# Patient Record
Sex: FEMALE | Race: OTHER
[De-identification: names, ages, dates, MRNs, and addresses within clinical notes are randomized per-mention and may not be internally consistent; named-entity substitution may affect disease eponyms.]

---

## 2020-06-03 ENCOUNTER — TRANSCRIPTION ENCOUNTER (OUTPATIENT)
Age: 85
End: 2020-06-03

## 2024-01-04 ENCOUNTER — INPATIENT (INPATIENT)
Facility: HOSPITAL | Age: 89
LOS: 1 days | Discharge: HOME | DRG: 322 | End: 2024-01-06
Attending: STUDENT IN AN ORGANIZED HEALTH CARE EDUCATION/TRAINING PROGRAM | Admitting: INTERNAL MEDICINE
Payer: MEDICARE

## 2024-01-04 ENCOUNTER — TRANSCRIPTION ENCOUNTER (OUTPATIENT)
Age: 89
End: 2024-01-04

## 2024-01-04 VITALS
HEART RATE: 79 BPM | HEIGHT: 65 IN | DIASTOLIC BLOOD PRESSURE: 64 MMHG | RESPIRATION RATE: 18 BRPM | WEIGHT: 145.06 LBS | TEMPERATURE: 96 F | SYSTOLIC BLOOD PRESSURE: 102 MMHG | OXYGEN SATURATION: 100 %

## 2024-01-04 DIAGNOSIS — Z90.710 ACQUIRED ABSENCE OF BOTH CERVIX AND UTERUS: Chronic | ICD-10-CM

## 2024-01-04 LAB
A1C WITH ESTIMATED AVERAGE GLUCOSE RESULT: 6 % — HIGH (ref 4–5.6)
A1C WITH ESTIMATED AVERAGE GLUCOSE RESULT: 6 % — HIGH (ref 4–5.6)
ALBUMIN SERPL ELPH-MCNC: 4.2 G/DL — SIGNIFICANT CHANGE UP (ref 3.3–5)
ALBUMIN SERPL ELPH-MCNC: 4.2 G/DL — SIGNIFICANT CHANGE UP (ref 3.3–5)
ALBUMIN SERPL ELPH-MCNC: 4.7 G/DL — SIGNIFICANT CHANGE UP (ref 3.3–5)
ALBUMIN SERPL ELPH-MCNC: 4.7 G/DL — SIGNIFICANT CHANGE UP (ref 3.3–5)
ALP SERPL-CCNC: 67 U/L — SIGNIFICANT CHANGE UP (ref 40–120)
ALP SERPL-CCNC: 67 U/L — SIGNIFICANT CHANGE UP (ref 40–120)
ALP SERPL-CCNC: 75 U/L — SIGNIFICANT CHANGE UP (ref 40–120)
ALP SERPL-CCNC: 75 U/L — SIGNIFICANT CHANGE UP (ref 40–120)
ALT FLD-CCNC: 19 U/L — SIGNIFICANT CHANGE UP (ref 10–45)
ALT FLD-CCNC: 19 U/L — SIGNIFICANT CHANGE UP (ref 10–45)
ALT FLD-CCNC: 20 U/L — SIGNIFICANT CHANGE UP (ref 10–45)
ALT FLD-CCNC: 20 U/L — SIGNIFICANT CHANGE UP (ref 10–45)
ANION GAP SERPL CALC-SCNC: 14 MMOL/L — SIGNIFICANT CHANGE UP (ref 5–17)
ANION GAP SERPL CALC-SCNC: 14 MMOL/L — SIGNIFICANT CHANGE UP (ref 5–17)
ANION GAP SERPL CALC-SCNC: 15 MMOL/L — SIGNIFICANT CHANGE UP (ref 5–17)
ANION GAP SERPL CALC-SCNC: 15 MMOL/L — SIGNIFICANT CHANGE UP (ref 5–17)
APTT BLD: 27.9 SEC — SIGNIFICANT CHANGE UP (ref 24.5–35.6)
APTT BLD: 27.9 SEC — SIGNIFICANT CHANGE UP (ref 24.5–35.6)
AST SERPL-CCNC: 35 U/L — SIGNIFICANT CHANGE UP (ref 10–40)
AST SERPL-CCNC: 35 U/L — SIGNIFICANT CHANGE UP (ref 10–40)
AST SERPL-CCNC: 73 U/L — HIGH (ref 10–40)
AST SERPL-CCNC: 73 U/L — HIGH (ref 10–40)
BASE EXCESS BLDV CALC-SCNC: 4.3 MMOL/L — HIGH (ref -2–3)
BASE EXCESS BLDV CALC-SCNC: 4.3 MMOL/L — HIGH (ref -2–3)
BASOPHILS # BLD AUTO: 0.04 K/UL — SIGNIFICANT CHANGE UP (ref 0–0.2)
BASOPHILS # BLD AUTO: 0.04 K/UL — SIGNIFICANT CHANGE UP (ref 0–0.2)
BASOPHILS # BLD AUTO: 0.06 K/UL — SIGNIFICANT CHANGE UP (ref 0–0.2)
BASOPHILS # BLD AUTO: 0.06 K/UL — SIGNIFICANT CHANGE UP (ref 0–0.2)
BASOPHILS NFR BLD AUTO: 0.5 % — SIGNIFICANT CHANGE UP (ref 0–2)
BASOPHILS NFR BLD AUTO: 0.5 % — SIGNIFICANT CHANGE UP (ref 0–2)
BASOPHILS NFR BLD AUTO: 0.7 % — SIGNIFICANT CHANGE UP (ref 0–2)
BASOPHILS NFR BLD AUTO: 0.7 % — SIGNIFICANT CHANGE UP (ref 0–2)
BILIRUB SERPL-MCNC: 0.7 MG/DL — SIGNIFICANT CHANGE UP (ref 0.2–1.2)
BLD GP AB SCN SERPL QL: NEGATIVE — SIGNIFICANT CHANGE UP
BLD GP AB SCN SERPL QL: NEGATIVE — SIGNIFICANT CHANGE UP
BUN SERPL-MCNC: 28 MG/DL — HIGH (ref 7–23)
BUN SERPL-MCNC: 28 MG/DL — HIGH (ref 7–23)
BUN SERPL-MCNC: 31 MG/DL — HIGH (ref 7–23)
BUN SERPL-MCNC: 31 MG/DL — HIGH (ref 7–23)
CA-I SERPL-SCNC: 1.19 MMOL/L — SIGNIFICANT CHANGE UP (ref 1.15–1.33)
CA-I SERPL-SCNC: 1.19 MMOL/L — SIGNIFICANT CHANGE UP (ref 1.15–1.33)
CALCIUM SERPL-MCNC: 10 MG/DL — SIGNIFICANT CHANGE UP (ref 8.4–10.5)
CALCIUM SERPL-MCNC: 10 MG/DL — SIGNIFICANT CHANGE UP (ref 8.4–10.5)
CALCIUM SERPL-MCNC: 10.5 MG/DL — SIGNIFICANT CHANGE UP (ref 8.4–10.5)
CALCIUM SERPL-MCNC: 10.5 MG/DL — SIGNIFICANT CHANGE UP (ref 8.4–10.5)
CHLORIDE SERPL-SCNC: 100 MMOL/L — SIGNIFICANT CHANGE UP (ref 96–108)
CHLORIDE SERPL-SCNC: 100 MMOL/L — SIGNIFICANT CHANGE UP (ref 96–108)
CHLORIDE SERPL-SCNC: 102 MMOL/L — SIGNIFICANT CHANGE UP (ref 96–108)
CHLORIDE SERPL-SCNC: 102 MMOL/L — SIGNIFICANT CHANGE UP (ref 96–108)
CHOLEST SERPL-MCNC: 321 MG/DL — HIGH
CHOLEST SERPL-MCNC: 321 MG/DL — HIGH
CK MB CFR SERPL CALC: 216.8 NG/ML — HIGH (ref 0–6.7)
CK MB CFR SERPL CALC: 216.8 NG/ML — HIGH (ref 0–6.7)
CK MB CFR SERPL CALC: 80.5 NG/ML — HIGH (ref 0–6.7)
CK MB CFR SERPL CALC: 80.5 NG/ML — HIGH (ref 0–6.7)
CK SERPL-CCNC: 1230 U/L — HIGH (ref 25–170)
CK SERPL-CCNC: 1230 U/L — HIGH (ref 25–170)
CK SERPL-CCNC: 555 U/L — HIGH (ref 25–170)
CK SERPL-CCNC: 555 U/L — HIGH (ref 25–170)
CO2 BLDV-SCNC: 30.6 MMOL/L — HIGH (ref 22–26)
CO2 BLDV-SCNC: 30.6 MMOL/L — HIGH (ref 22–26)
CO2 SERPL-SCNC: 24 MMOL/L — SIGNIFICANT CHANGE UP (ref 22–31)
CO2 SERPL-SCNC: 24 MMOL/L — SIGNIFICANT CHANGE UP (ref 22–31)
CO2 SERPL-SCNC: 25 MMOL/L — SIGNIFICANT CHANGE UP (ref 22–31)
CO2 SERPL-SCNC: 25 MMOL/L — SIGNIFICANT CHANGE UP (ref 22–31)
COHGB MFR BLDV: 1.7 % — SIGNIFICANT CHANGE UP
COHGB MFR BLDV: 1.7 % — SIGNIFICANT CHANGE UP
CREAT SERPL-MCNC: 0.98 MG/DL — SIGNIFICANT CHANGE UP (ref 0.5–1.3)
CREAT SERPL-MCNC: 0.98 MG/DL — SIGNIFICANT CHANGE UP (ref 0.5–1.3)
CREAT SERPL-MCNC: 1 MG/DL — SIGNIFICANT CHANGE UP (ref 0.5–1.3)
CREAT SERPL-MCNC: 1 MG/DL — SIGNIFICANT CHANGE UP (ref 0.5–1.3)
EGFR: 54 ML/MIN/1.73M2 — LOW
EGFR: 54 ML/MIN/1.73M2 — LOW
EGFR: 56 ML/MIN/1.73M2 — LOW
EGFR: 56 ML/MIN/1.73M2 — LOW
EOSINOPHIL # BLD AUTO: 0.1 K/UL — SIGNIFICANT CHANGE UP (ref 0–0.5)
EOSINOPHIL # BLD AUTO: 0.1 K/UL — SIGNIFICANT CHANGE UP (ref 0–0.5)
EOSINOPHIL # BLD AUTO: 0.34 K/UL — SIGNIFICANT CHANGE UP (ref 0–0.5)
EOSINOPHIL # BLD AUTO: 0.34 K/UL — SIGNIFICANT CHANGE UP (ref 0–0.5)
EOSINOPHIL NFR BLD AUTO: 1.3 % — SIGNIFICANT CHANGE UP (ref 0–6)
EOSINOPHIL NFR BLD AUTO: 1.3 % — SIGNIFICANT CHANGE UP (ref 0–6)
EOSINOPHIL NFR BLD AUTO: 4 % — SIGNIFICANT CHANGE UP (ref 0–6)
EOSINOPHIL NFR BLD AUTO: 4 % — SIGNIFICANT CHANGE UP (ref 0–6)
ESTIMATED AVERAGE GLUCOSE: 126 MG/DL — HIGH (ref 68–114)
ESTIMATED AVERAGE GLUCOSE: 126 MG/DL — HIGH (ref 68–114)
GAS PNL BLDV: 142 MMOL/L — SIGNIFICANT CHANGE UP (ref 136–145)
GAS PNL BLDV: 142 MMOL/L — SIGNIFICANT CHANGE UP (ref 136–145)
GLUCOSE BLDV-MCNC: 157 MG/DL — HIGH (ref 70–99)
GLUCOSE BLDV-MCNC: 157 MG/DL — HIGH (ref 70–99)
GLUCOSE SERPL-MCNC: 147 MG/DL — HIGH (ref 70–99)
GLUCOSE SERPL-MCNC: 147 MG/DL — HIGH (ref 70–99)
GLUCOSE SERPL-MCNC: 156 MG/DL — HIGH (ref 70–99)
GLUCOSE SERPL-MCNC: 156 MG/DL — HIGH (ref 70–99)
HCO3 BLDV-SCNC: 29 MMOL/L — SIGNIFICANT CHANGE UP (ref 22–29)
HCO3 BLDV-SCNC: 29 MMOL/L — SIGNIFICANT CHANGE UP (ref 22–29)
HCT VFR BLD CALC: 40.5 % — SIGNIFICANT CHANGE UP (ref 34.5–45)
HCT VFR BLD CALC: 40.5 % — SIGNIFICANT CHANGE UP (ref 34.5–45)
HCT VFR BLD CALC: 42.3 % — SIGNIFICANT CHANGE UP (ref 34.5–45)
HCT VFR BLD CALC: 42.3 % — SIGNIFICANT CHANGE UP (ref 34.5–45)
HDLC SERPL-MCNC: 78 MG/DL — SIGNIFICANT CHANGE UP
HDLC SERPL-MCNC: 78 MG/DL — SIGNIFICANT CHANGE UP
HGB BLD CALC-MCNC: 12.2 G/DL — SIGNIFICANT CHANGE UP (ref 11.7–16.1)
HGB BLD CALC-MCNC: 12.2 G/DL — SIGNIFICANT CHANGE UP (ref 11.7–16.1)
HGB BLD-MCNC: 13.6 G/DL — SIGNIFICANT CHANGE UP (ref 11.5–15.5)
HGB BLD-MCNC: 13.6 G/DL — SIGNIFICANT CHANGE UP (ref 11.5–15.5)
HGB BLD-MCNC: 14.3 G/DL — SIGNIFICANT CHANGE UP (ref 11.5–15.5)
HGB BLD-MCNC: 14.3 G/DL — SIGNIFICANT CHANGE UP (ref 11.5–15.5)
IMM GRANULOCYTES NFR BLD AUTO: 0.1 % — SIGNIFICANT CHANGE UP (ref 0–0.9)
IMM GRANULOCYTES NFR BLD AUTO: 0.1 % — SIGNIFICANT CHANGE UP (ref 0–0.9)
IMM GRANULOCYTES NFR BLD AUTO: 0.3 % — SIGNIFICANT CHANGE UP (ref 0–0.9)
IMM GRANULOCYTES NFR BLD AUTO: 0.3 % — SIGNIFICANT CHANGE UP (ref 0–0.9)
INR BLD: 1.05 — SIGNIFICANT CHANGE UP (ref 0.85–1.18)
INR BLD: 1.05 — SIGNIFICANT CHANGE UP (ref 0.85–1.18)
INR BLD: 1.25 — HIGH (ref 0.85–1.18)
INR BLD: 1.25 — HIGH (ref 0.85–1.18)
LACTATE SERPL-SCNC: 1.8 MMOL/L — SIGNIFICANT CHANGE UP (ref 0.5–2)
LACTATE SERPL-SCNC: 1.8 MMOL/L — SIGNIFICANT CHANGE UP (ref 0.5–2)
LIPID PNL WITH DIRECT LDL SERPL: 231 MG/DL — HIGH
LIPID PNL WITH DIRECT LDL SERPL: 231 MG/DL — HIGH
LYMPHOCYTES # BLD AUTO: 1.55 K/UL — SIGNIFICANT CHANGE UP (ref 1–3.3)
LYMPHOCYTES # BLD AUTO: 1.55 K/UL — SIGNIFICANT CHANGE UP (ref 1–3.3)
LYMPHOCYTES # BLD AUTO: 19.6 % — SIGNIFICANT CHANGE UP (ref 13–44)
LYMPHOCYTES # BLD AUTO: 19.6 % — SIGNIFICANT CHANGE UP (ref 13–44)
LYMPHOCYTES # BLD AUTO: 2.68 K/UL — SIGNIFICANT CHANGE UP (ref 1–3.3)
LYMPHOCYTES # BLD AUTO: 2.68 K/UL — SIGNIFICANT CHANGE UP (ref 1–3.3)
LYMPHOCYTES # BLD AUTO: 31.3 % — SIGNIFICANT CHANGE UP (ref 13–44)
LYMPHOCYTES # BLD AUTO: 31.3 % — SIGNIFICANT CHANGE UP (ref 13–44)
MAGNESIUM SERPL-MCNC: 2.1 MG/DL — SIGNIFICANT CHANGE UP (ref 1.6–2.6)
MAGNESIUM SERPL-MCNC: 2.1 MG/DL — SIGNIFICANT CHANGE UP (ref 1.6–2.6)
MCHC RBC-ENTMCNC: 32.3 PG — SIGNIFICANT CHANGE UP (ref 27–34)
MCHC RBC-ENTMCNC: 32.3 PG — SIGNIFICANT CHANGE UP (ref 27–34)
MCHC RBC-ENTMCNC: 32.6 PG — SIGNIFICANT CHANGE UP (ref 27–34)
MCHC RBC-ENTMCNC: 32.6 PG — SIGNIFICANT CHANGE UP (ref 27–34)
MCHC RBC-ENTMCNC: 33.6 GM/DL — SIGNIFICANT CHANGE UP (ref 32–36)
MCHC RBC-ENTMCNC: 33.6 GM/DL — SIGNIFICANT CHANGE UP (ref 32–36)
MCHC RBC-ENTMCNC: 33.8 GM/DL — SIGNIFICANT CHANGE UP (ref 32–36)
MCHC RBC-ENTMCNC: 33.8 GM/DL — SIGNIFICANT CHANGE UP (ref 32–36)
MCV RBC AUTO: 96.2 FL — SIGNIFICANT CHANGE UP (ref 80–100)
MCV RBC AUTO: 96.2 FL — SIGNIFICANT CHANGE UP (ref 80–100)
MCV RBC AUTO: 96.6 FL — SIGNIFICANT CHANGE UP (ref 80–100)
MCV RBC AUTO: 96.6 FL — SIGNIFICANT CHANGE UP (ref 80–100)
METHGB MFR BLDV: 0.7 % — SIGNIFICANT CHANGE UP
METHGB MFR BLDV: 0.7 % — SIGNIFICANT CHANGE UP
MONOCYTES # BLD AUTO: 0.34 K/UL — SIGNIFICANT CHANGE UP (ref 0–0.9)
MONOCYTES # BLD AUTO: 0.34 K/UL — SIGNIFICANT CHANGE UP (ref 0–0.9)
MONOCYTES # BLD AUTO: 0.48 K/UL — SIGNIFICANT CHANGE UP (ref 0–0.9)
MONOCYTES # BLD AUTO: 0.48 K/UL — SIGNIFICANT CHANGE UP (ref 0–0.9)
MONOCYTES NFR BLD AUTO: 4.3 % — SIGNIFICANT CHANGE UP (ref 2–14)
MONOCYTES NFR BLD AUTO: 4.3 % — SIGNIFICANT CHANGE UP (ref 2–14)
MONOCYTES NFR BLD AUTO: 5.6 % — SIGNIFICANT CHANGE UP (ref 2–14)
MONOCYTES NFR BLD AUTO: 5.6 % — SIGNIFICANT CHANGE UP (ref 2–14)
NEUTROPHILS # BLD AUTO: 4.98 K/UL — SIGNIFICANT CHANGE UP (ref 1.8–7.4)
NEUTROPHILS # BLD AUTO: 4.98 K/UL — SIGNIFICANT CHANGE UP (ref 1.8–7.4)
NEUTROPHILS # BLD AUTO: 5.85 K/UL — SIGNIFICANT CHANGE UP (ref 1.8–7.4)
NEUTROPHILS # BLD AUTO: 5.85 K/UL — SIGNIFICANT CHANGE UP (ref 1.8–7.4)
NEUTROPHILS NFR BLD AUTO: 58.3 % — SIGNIFICANT CHANGE UP (ref 43–77)
NEUTROPHILS NFR BLD AUTO: 58.3 % — SIGNIFICANT CHANGE UP (ref 43–77)
NEUTROPHILS NFR BLD AUTO: 74 % — SIGNIFICANT CHANGE UP (ref 43–77)
NEUTROPHILS NFR BLD AUTO: 74 % — SIGNIFICANT CHANGE UP (ref 43–77)
NON HDL CHOLESTEROL: 243 MG/DL — HIGH
NON HDL CHOLESTEROL: 243 MG/DL — HIGH
NRBC # BLD: 0 /100 WBCS — SIGNIFICANT CHANGE UP (ref 0–0)
NT-PROBNP SERPL-SCNC: 699 PG/ML — HIGH (ref 0–300)
NT-PROBNP SERPL-SCNC: 699 PG/ML — HIGH (ref 0–300)
NT-PROBNP SERPL-SCNC: 809 PG/ML — HIGH (ref 0–300)
NT-PROBNP SERPL-SCNC: 809 PG/ML — HIGH (ref 0–300)
PCO2 BLDV: 44 MMHG — SIGNIFICANT CHANGE UP (ref 39–52)
PCO2 BLDV: 44 MMHG — SIGNIFICANT CHANGE UP (ref 39–52)
PH BLDV: 7.43 — SIGNIFICANT CHANGE UP (ref 7.32–7.43)
PH BLDV: 7.43 — SIGNIFICANT CHANGE UP (ref 7.32–7.43)
PHOSPHATE SERPL-MCNC: 3.1 MG/DL — SIGNIFICANT CHANGE UP (ref 2.5–4.5)
PHOSPHATE SERPL-MCNC: 3.1 MG/DL — SIGNIFICANT CHANGE UP (ref 2.5–4.5)
PLATELET # BLD AUTO: 301 K/UL — SIGNIFICANT CHANGE UP (ref 150–400)
PLATELET # BLD AUTO: 301 K/UL — SIGNIFICANT CHANGE UP (ref 150–400)
PLATELET # BLD AUTO: 358 K/UL — SIGNIFICANT CHANGE UP (ref 150–400)
PLATELET # BLD AUTO: 358 K/UL — SIGNIFICANT CHANGE UP (ref 150–400)
PO2 BLDV: 42 MMHG — SIGNIFICANT CHANGE UP (ref 25–45)
PO2 BLDV: 42 MMHG — SIGNIFICANT CHANGE UP (ref 25–45)
POTASSIUM BLDV-SCNC: 3.6 MMOL/L — SIGNIFICANT CHANGE UP (ref 3.5–5.1)
POTASSIUM BLDV-SCNC: 3.6 MMOL/L — SIGNIFICANT CHANGE UP (ref 3.5–5.1)
POTASSIUM SERPL-MCNC: 3.9 MMOL/L — SIGNIFICANT CHANGE UP (ref 3.5–5.3)
POTASSIUM SERPL-MCNC: 3.9 MMOL/L — SIGNIFICANT CHANGE UP (ref 3.5–5.3)
POTASSIUM SERPL-MCNC: 4.1 MMOL/L — SIGNIFICANT CHANGE UP (ref 3.5–5.3)
POTASSIUM SERPL-MCNC: 4.1 MMOL/L — SIGNIFICANT CHANGE UP (ref 3.5–5.3)
POTASSIUM SERPL-SCNC: 3.9 MMOL/L — SIGNIFICANT CHANGE UP (ref 3.5–5.3)
POTASSIUM SERPL-SCNC: 3.9 MMOL/L — SIGNIFICANT CHANGE UP (ref 3.5–5.3)
POTASSIUM SERPL-SCNC: 4.1 MMOL/L — SIGNIFICANT CHANGE UP (ref 3.5–5.3)
POTASSIUM SERPL-SCNC: 4.1 MMOL/L — SIGNIFICANT CHANGE UP (ref 3.5–5.3)
PROT SERPL-MCNC: 8.3 G/DL — SIGNIFICANT CHANGE UP (ref 6–8.3)
PROTHROM AB SERPL-ACNC: 11.9 SEC — SIGNIFICANT CHANGE UP (ref 9.5–13)
PROTHROM AB SERPL-ACNC: 11.9 SEC — SIGNIFICANT CHANGE UP (ref 9.5–13)
PROTHROM AB SERPL-ACNC: 14.2 SEC — HIGH (ref 9.5–13)
PROTHROM AB SERPL-ACNC: 14.2 SEC — HIGH (ref 9.5–13)
RBC # BLD: 4.21 M/UL — SIGNIFICANT CHANGE UP (ref 3.8–5.2)
RBC # BLD: 4.21 M/UL — SIGNIFICANT CHANGE UP (ref 3.8–5.2)
RBC # BLD: 4.38 M/UL — SIGNIFICANT CHANGE UP (ref 3.8–5.2)
RBC # BLD: 4.38 M/UL — SIGNIFICANT CHANGE UP (ref 3.8–5.2)
RBC # FLD: 13.5 % — SIGNIFICANT CHANGE UP (ref 10.3–14.5)
RBC # FLD: 13.5 % — SIGNIFICANT CHANGE UP (ref 10.3–14.5)
RBC # FLD: 13.6 % — SIGNIFICANT CHANGE UP (ref 10.3–14.5)
RBC # FLD: 13.6 % — SIGNIFICANT CHANGE UP (ref 10.3–14.5)
RH IG SCN BLD-IMP: POSITIVE — SIGNIFICANT CHANGE UP
RH IG SCN BLD-IMP: POSITIVE — SIGNIFICANT CHANGE UP
SAO2 % BLDV: 62 % — LOW (ref 67–88)
SAO2 % BLDV: 62 % — LOW (ref 67–88)
SODIUM SERPL-SCNC: 139 MMOL/L — SIGNIFICANT CHANGE UP (ref 135–145)
SODIUM SERPL-SCNC: 139 MMOL/L — SIGNIFICANT CHANGE UP (ref 135–145)
SODIUM SERPL-SCNC: 141 MMOL/L — SIGNIFICANT CHANGE UP (ref 135–145)
SODIUM SERPL-SCNC: 141 MMOL/L — SIGNIFICANT CHANGE UP (ref 135–145)
T4 AB SER-ACNC: 9.29 UG/DL — SIGNIFICANT CHANGE UP (ref 4.5–11.7)
T4 AB SER-ACNC: 9.29 UG/DL — SIGNIFICANT CHANGE UP (ref 4.5–11.7)
TRIGL SERPL-MCNC: 58 MG/DL — SIGNIFICANT CHANGE UP
TRIGL SERPL-MCNC: 58 MG/DL — SIGNIFICANT CHANGE UP
TROPONIN T, HIGH SENSITIVITY RESULT: 2779 NG/L — CRITICAL HIGH (ref 0–51)
TROPONIN T, HIGH SENSITIVITY RESULT: 2779 NG/L — CRITICAL HIGH (ref 0–51)
TROPONIN T, HIGH SENSITIVITY RESULT: 820 NG/L — CRITICAL HIGH (ref 0–51)
TROPONIN T, HIGH SENSITIVITY RESULT: 820 NG/L — CRITICAL HIGH (ref 0–51)
TROPONIN T, HIGH SENSITIVITY RESULT: 91 NG/L — CRITICAL HIGH (ref 0–51)
TROPONIN T, HIGH SENSITIVITY RESULT: 91 NG/L — CRITICAL HIGH (ref 0–51)
TSH SERPL-MCNC: 1.82 UIU/ML — SIGNIFICANT CHANGE UP (ref 0.27–4.2)
TSH SERPL-MCNC: 1.82 UIU/ML — SIGNIFICANT CHANGE UP (ref 0.27–4.2)
WBC # BLD: 7.9 K/UL — SIGNIFICANT CHANGE UP (ref 3.8–10.5)
WBC # BLD: 7.9 K/UL — SIGNIFICANT CHANGE UP (ref 3.8–10.5)
WBC # BLD: 8.55 K/UL — SIGNIFICANT CHANGE UP (ref 3.8–10.5)
WBC # BLD: 8.55 K/UL — SIGNIFICANT CHANGE UP (ref 3.8–10.5)
WBC # FLD AUTO: 7.9 K/UL — SIGNIFICANT CHANGE UP (ref 3.8–10.5)
WBC # FLD AUTO: 7.9 K/UL — SIGNIFICANT CHANGE UP (ref 3.8–10.5)
WBC # FLD AUTO: 8.55 K/UL — SIGNIFICANT CHANGE UP (ref 3.8–10.5)
WBC # FLD AUTO: 8.55 K/UL — SIGNIFICANT CHANGE UP (ref 3.8–10.5)

## 2024-01-04 PROCEDURE — 71045 X-RAY EXAM CHEST 1 VIEW: CPT | Mod: 26

## 2024-01-04 PROCEDURE — 93010 ELECTROCARDIOGRAM REPORT: CPT

## 2024-01-04 PROCEDURE — 93306 TTE W/DOPPLER COMPLETE: CPT | Mod: 26

## 2024-01-04 PROCEDURE — 99291 CRITICAL CARE FIRST HOUR: CPT

## 2024-01-04 PROCEDURE — 92941 PRQ TRLML REVSC TOT OCCL AMI: CPT | Mod: LD

## 2024-01-04 PROCEDURE — 99152 MOD SED SAME PHYS/QHP 5/>YRS: CPT

## 2024-01-04 PROCEDURE — 93460 R&L HRT ART/VENTRICLE ANGIO: CPT | Mod: 26,59

## 2024-01-04 RX ORDER — LEVOTHYROXINE SODIUM 125 MCG
88 TABLET ORAL EVERY 24 HOURS
Refills: 0 | Status: DISCONTINUED | OUTPATIENT
Start: 2024-01-05 | End: 2024-01-06

## 2024-01-04 RX ORDER — HEPARIN SODIUM 5000 [USP'U]/ML
5000 INJECTION INTRAVENOUS; SUBCUTANEOUS EVERY 12 HOURS
Refills: 0 | Status: DISCONTINUED | OUTPATIENT
Start: 2024-01-04 | End: 2024-01-06

## 2024-01-04 RX ORDER — VALSARTAN 80 MG/1
20 TABLET ORAL EVERY 12 HOURS
Refills: 0 | Status: DISCONTINUED | OUTPATIENT
Start: 2024-01-04 | End: 2024-01-05

## 2024-01-04 RX ORDER — TICAGRELOR 90 MG/1
90 TABLET ORAL EVERY 12 HOURS
Refills: 0 | Status: DISCONTINUED | OUTPATIENT
Start: 2024-01-04 | End: 2024-01-04

## 2024-01-04 RX ORDER — TICAGRELOR 90 MG/1
90 TABLET ORAL EVERY 12 HOURS
Refills: 0 | Status: DISCONTINUED | OUTPATIENT
Start: 2024-01-04 | End: 2024-01-06

## 2024-01-04 RX ORDER — ASPIRIN/CALCIUM CARB/MAGNESIUM 324 MG
81 TABLET ORAL EVERY 24 HOURS
Refills: 0 | Status: DISCONTINUED | OUTPATIENT
Start: 2024-01-04 | End: 2024-01-06

## 2024-01-04 RX ORDER — ATORVASTATIN CALCIUM 80 MG/1
80 TABLET, FILM COATED ORAL AT BEDTIME
Refills: 0 | Status: DISCONTINUED | OUTPATIENT
Start: 2024-01-04 | End: 2024-01-05

## 2024-01-04 RX ADMIN — TICAGRELOR 90 MILLIGRAM(S): 90 TABLET ORAL at 21:39

## 2024-01-04 RX ADMIN — HEPARIN SODIUM 5000 UNIT(S): 5000 INJECTION INTRAVENOUS; SUBCUTANEOUS at 21:40

## 2024-01-04 RX ADMIN — ATORVASTATIN CALCIUM 80 MILLIGRAM(S): 80 TABLET, FILM COATED ORAL at 21:39

## 2024-01-04 RX ADMIN — Medication 81 MILLIGRAM(S): at 21:34

## 2024-01-04 RX ADMIN — VALSARTAN 20 MILLIGRAM(S): 80 TABLET ORAL at 17:11

## 2024-01-04 NOTE — H&P ADULT - ASSESSMENT
Pt is a 88F with a PMHx hypothyroidism p/w 1 hour of CP found to have anterolateral STEMI.    #Neuro  at baseline    Cards  #Anterolateral STEMI  Pt with substernal CP and EKG with FREDERICK on V1-V3. s/p Asa 325 prior to arrival to the ED. Pt went to cath lab and LHC showing 100% occlusion of mid LAD, thrombotic s/p SHILO and Nitroglycerin. Troponin 91. Pro-. Post-revascularization EKG with FREDERICK in V1 and V2, TWI in V1-V3.  - c/w ASA 81qd  - c/w statin 80mg   - start brilinta 80mg qd  - start betablocker tmmwr  - f/u A1c, Lipid profile  - f/u repeat echo, EKG  - f/u lactate  - trend trops    Pulm  CARMENZA    #Endocrine  hx of hypothyroidism  - f/u TSH, Free T4    #GI  CARMENZA    #Renal  CARMENZA    #  CARMENZA    Fluids: none  Electrolytes: Mg >2, K >4  Nutrition: Regular diet  Prophylaxis: heparin subq  Activity: as tolerated  GI: none  C: ?  Dispo: CCU       Pt is a 88F with a PMHx hypothyroidism p/w 1 hour of CP found to have anterolateral STEMI.    #Neuro  at baseline    Cards  #Anterolateral STEMI  Pt with substernal CP and EKG with FREDERICK on V1-V3. s/p Asa 325 prior to arrival to the ED. Pt went to cath lab and LHC showing 100% occlusion of mid LAD, thrombotic s/p SHILO and Nitroglycerin. Troponin 91. Pro-. Post-revascularization EKG with FREDERICK in V1 and V2, TWI in V1-V3.  - c/w ASA 81qd  - c/w statin 80mg   - start brilinta 80mg qd  - start betablocker tmmwr  - f/u A1c, Lipid profile  - f/u repeat echo, EKG  - f/u lactate  - trend trops    Pulm  CARMENZA    #Endocrine  hx of hypothyroidism. On synthroid 88mg  - f/u TSH, Free T4  - c/w home meds    #GI  CARMENZA    #Renal  CARMENZA    #  CARMENZA    Fluids: none  Electrolytes: Mg >2, K >4  Nutrition: Regular diet  Prophylaxis: heparin subq  Activity: as tolerated  GI: none  C: ?  Dispo: CCU       Pt is a 88F with a PMHx hypothyroidism p/w 1 hour of CP found to have anterolateral STEMI s/p SHILO to mLAD    #Neuro  at baseline    Cards  #Anterolateral STEMI  Pt with substernal CP and EKG with FREDERICK on V1-V3. s/p Asa 325 prior to arrival to the ED. Pt went to cath lab and LHC showing 100% occlusion of mid LAD, thrombotic s/p SHILO and Nitroglycerin. Troponin 91. Pro-. Post-revascularization EKG with FREDERICK in V1 and V2, TWI in V1-V3.  - c/w ASA 81qd  - c/w statin 80mg   - start brilinta 80mg qd  - start betablocker tmmwr  - f/u A1c, Lipid profile  - f/u repeat echo, EKG  - f/u lactate  - trend trops    Pulm  CARMENZA    #Endocrine  hx of hypothyroidism. On synthroid 88mg  - f/u TSH, Free T4  - c/w home meds    #GI  CARMENZA    #Renal  CARMENZA    #  CARMENZA    Fluids: none  Electrolytes: Mg >2, K >4  Nutrition: Regular diet  Prophylaxis: heparin subq  Activity: as tolerated  GI: none  C: ?  Dispo: CCU

## 2024-01-04 NOTE — ED PROVIDER NOTE - CRITICAL CARE ATTENDING CONTRIBUTION TO CARE
Upon my evaluation, this patient had a high probability of imminent or life-threatening deterioration due to STEMI which required my direct attention, intervention, and personal management.    I have personally provided the above minutes of critical care time exclusive of time spent on separately billable procedures. Time includes review of laboratory data, radiology results, discussion with consultants, and monitoring for potential decompensation. Interventions were performed as documented above.

## 2024-01-04 NOTE — H&P ADULT - HISTORY OF PRESENT ILLNESS
In the ED:    Vitals:  labs:  EKG:  CXR:  interventions:     88F with a PMHx hypothyroidism who was BIBEMS due to n/v/diarrhea followed by substernal chest pain.    STEMI    In the ED:   Vitals: HR 79, /64, RR 18, SpO2 100%  labs: BUN 31, pro-, PCO2 30.6  EKG: FREDERICK in V1, V2, V3  CXR:   interventions: .     88F with a PMHx hypothyroidism who was BIBEMS due to n/v/diarrhea followed by substernal chest pain for an hour while working out around 10a. In the ED, she was found to have FREDERICK in V1-V3 (anterolateral STEMI). LHC showing 100% occlusion of mid LAD, thrombotic s/p SHILO and Nitroglycerin.    In the ED:   Vitals: HR 79, /64, RR 18, SpO2 100%  labs: BUN 31, pro-, PCO2 30.6  EKG: FREDERICK in V1, V2, V3  interventions:  prior to arrival     88F with a PMHx hypothyroidism who was BIBEMS due to n/v/diarrhea followed by substernal chest pain for an hour before her pilates class around 10a. She denies any previous history of CP or SOB.  In the ED, she was found to have FREDERICK in V1-V3 (anterolateral STEMI). LHC showing 100% occlusion of mid LAD, thrombotic s/p SHILO and Nitroglycerin.    Upon arrival to CCU, pt does not endorse any SOB, CP, further episodes of N/V or diarrhea. Overall, she feels well.    In the ED:   Vitals: HR 79, /64, RR 18, SpO2 100%  labs: BUN 31, pro-, PCO2 30.6  EKG: FREDERICK in V1, V2, V3  interventions:  prior to arrival

## 2024-01-04 NOTE — ED ADULT NURSE NOTE - OBJECTIVE STATEMENT
88yF BIBEMS STEMI cod. ST elevations in V1,V2,V3.Receved 324 mg ASA prior to arrival. PT Axox4 reports mild midsternal chest pain. Cath lab initiated on arrival by Dr. Cortez. Interventional cardiology fellow at bedside at 11:32, pt left unit at 11:38.

## 2024-01-04 NOTE — H&P ADULT - NSHPLABSRESULTS_GEN_ALL_CORE
LABS:                         14.3   8.55  )-----------( 358      ( 04 Jan 2024 11:31 )             42.3     01-04    141  |  102  |  31<H>  ----------------------------<  156<H>  3.9   |  25  |  1.00    Ca    10.5      04 Jan 2024 11:31    TPro  8.3  /  Alb  4.7  /  TBili  0.7  /  DBili  x   /  AST  35  /  ALT  20  /  AlkPhos  75  01-04    PT/INR - ( 04 Jan 2024 11:31 )   PT: 11.9 sec;   INR: 1.05          PTT - ( 04 Jan 2024 11:31 )  PTT:27.9 sec  Urinalysis Basic - ( 04 Jan 2024 11:31 )    Color: x / Appearance: x / SG: x / pH: x  Gluc: 156 mg/dL / Ketone: x  / Bili: x / Urobili: x   Blood: x / Protein: x / Nitrite: x   Leuk Esterase: x / RBC: x / WBC x   Sq Epi: x / Non Sq Epi: x / Bacteria: x                RADIOLOGY, EKG & ADDITIONAL TESTS: Reviewed.

## 2024-01-04 NOTE — ED ADULT NURSE NOTE - CHIEF COMPLAINT QUOTE
Pt aaox3 c/o midsternal CP that started when the pt was at the [facility]. Pt has 1x episode of N/V and diarrhea. Pt placed on 2L NC and given 324 asprin by EMS PTA. Pt aaox3 c/o midsternal CP that started when the pt was at the [facility]. Pt has 1x episode of N/V and diarrhea. Pt placed on 2L NC and given 324 asprin by EMS PTA.

## 2024-01-04 NOTE — DISCHARGE NOTE PROVIDER - NSDCCPCAREPLAN_GEN_ALL_CORE_FT
PRINCIPAL DISCHARGE DIAGNOSIS  Diagnosis: STEMI (ST elevation myocardial infarction)  Assessment and Plan of Treatment: A heart attack is an event that occurs when part of the heart muscle does not get enough blood and oxygen. This part of the heart starts to die. A heart attack is also called a myocardial infarction, or MI.   A heart attack most often happens because blood flow through one or more of the coronary arteries is blocked. This blockage is usually caused by a blood clot that forms when plaque in the artery breaks open. When that happens, we may see some elevation of lines on EKG, which is a dangerous sign.  Luckily we were able to take you to the cath lab and we performed Percutaneous coronary intervention (PCI) to open up your blocked artery and put a stent in to keep it open.   After a heart attack, you may be worried about your future. Over the next several weeks, your heart will start to heal. Though it can be hard to break old habits, you can reduce your risk of having another heart attack. You can do this by making some lifestyle changes and by taking medicines. You would need to take medicines to help your heart heal (Guideline-directed medical therapy (GDMT)) and also antiplatelets to keep the stent open.   Please follow up with your Primary Care Physician and cardiologist within ___________________ week of discharge from the hospital.     PRINCIPAL DISCHARGE DIAGNOSIS  Diagnosis: STEMI (ST elevation myocardial infarction)  Assessment and Plan of Treatment: A heart attack is an event that occurs when part of the heart muscle does not get enough blood and oxygen. This part of the heart starts to die. A heart attack is also called a myocardial infarction, or MI.   A heart attack most often happens because blood flow through one or more of the coronary arteries is blocked. This blockage is usually caused by a blood clot that forms when plaque in the artery breaks open. When that happens, we may see some elevation of lines on EKG, which is a dangerous sign.  Luckily we were able to take you to the cath lab and we performed Percutaneous coronary intervention (PCI) to open up your blocked artery and put a stent in to keep it open.   After a heart attack, you may be worried about your future. Over the next several weeks, your heart will start to heal. Though it can be hard to break old habits, you can reduce your risk of having another heart attack. You can do this by making some lifestyle changes and by taking medicines. You would need to take medicines to help your heart heal (Guideline-directed medical therapy (GDMT)) and also antiplatelets to keep the stent open.   Please follow up with your Primary Care Physician and cardiologist within one  week of discharge from the hospital.

## 2024-01-04 NOTE — ED PROVIDER NOTE - OBJECTIVE STATEMENT
88F with a PMHx hypothyroidism who was BIBEMS as STEMI. Pt was getting ready for a workout class at 10am when she had n/v/diarrhea followed by substernal chest pain. EMR noted FREDERICK in V1, V2, V3 and gave her asa 324 PTA. On arrival to the ER, pt states CP has improved and is now 1/10. She denies hx of CAD. No SOB, LH, dizziness or syncope. Cath lab code called immediately upon arrival to the ER.

## 2024-01-04 NOTE — H&P ADULT - ATTENDING COMMENTS
Pt is an 87 y/o woman c hypothyroidism who p/w CP and found to have Ant STEMI on ECG.    ECG: Sinus @ 86 c 7mm FREDERICK V1-V4  Cath: mid % s/p PCI    Agree to admit to CICU  Monitor rhythm  Would start ARB for Ant Stemi  Check TTE Pt is an 89 y/o woman c hypothyroidism who p/w CP and found to have Ant STEMI on ECG.    ECG: Sinus @ 86 c 7mm FREDERICK V1-V4  Cath: mid % s/p PCI    Agree to admit to CICU  Monitor rhythm  Would start ARB for Ant Stemi  Check TTE

## 2024-01-04 NOTE — DISCHARGE NOTE PROVIDER - PROVIDER TOKENS
PROVIDER:[TOKEN:[[id number]],FOLLOWUP:[1 week]] PROVIDER:[TOKEN:[[id number]],FOLLOWUP:[1 week]] FREE:[LAST:[PARK],FIRST:[FELIX DORAN],PHONE:[(   )    -],FAX:[(   )    -],ADDRESS:[[address] [place] [address] Floor 1  [phone]  Follow up in 1 week],FOLLOWUP:[1 week]] FREE:[LAST:[PARK],FIRST:[FELIX DORAN],PHONE:[(   )    -],FAX:[(   )    -],ADDRESS:[[address] [place] [address] Floor 1  [phone]  Follow up in 1 week],FOLLOWUP:[1 week]]

## 2024-01-04 NOTE — DISCHARGE NOTE PROVIDER - HOSPITAL COURSE
#Discharge: do not delete    88F with a PMHx hypothyroidism p/w 1 hour of CP found to have anterolateral STEMI s/p SHILO to mLAD      Problem List/Main Diagnoses:   Inpatient treatment course:   New medications:   Labs to be followed outpatient:   Exam to be followed outpatient: #Discharge: do not delete    88F with a PMHx hypothyroidism, HLD, HFrEF (25-30%) p/w 1 hour of CP found to have anterolateral STEMI s/p SHILO to mLAD.    #Anterolateral STEMI s/p SHILO to mLAD  Pt with substernal CP and EKG with FREDERICK on V1-V3. s/p Asa 325 prior to arrival to the ED. Pt went to cath lab and LHC showing 100% occlusion of mid LAD, thrombotic s/p SHILO and Nitroglycerin. Troponin 91. Pro-. Post-revascularization EKG with FREDERICK in V1 and V2, TWI in V1-V3. DC'd Entresto as pt has JUANITA  - c/w toprol 25 for   - c/w ASA 81qd  - c/w statin 40mg   - c/w brilinta 80mg qd  - f/u outpatient Cardiology    #HFrEF  TTE (1/4/23) showing Left ventricular systolic function is severely reduced w/ EF of 25-30% with regional wall motion abnormalities  - c/w toprol 25  - start ACEi/ARB    #Hypothyroidism  Home meds: synthroid 88mg  - c/w home meds  - outpatient PCP follow up    #Pre-diabetes  A1c 6.0  - c/w lifestyle modifications  - f/u outpatient PCP    New medications: ASA, statin, Metoprolol succinate, ARB  Labs to be followed outpatient: A1c in three months  Exam to be followed outpatient: none    Physical exam at the time of discharge:  General: NAD, lying in bed comfortably  Head: NC/AT; MMM; PERRL; EOMI;  Neck: Supple; no JVD  Respiratory: CTAB; no wheezes/rales/rhonchi  Cardiovascular: Regular rhythm/rate; S1/S2+, no murmurs, rubs gallops   Gastrointestinal: Soft; NTND; bowel sounds normal and present  Extremities: WWP; no edema/cyanosis  Neurological: A&Ox3, answering all questions appropriately       #Discharge: do not delete    88F with a PMHx hypothyroidism, HLD, HFrEF (25-30%) p/w 1 hour of CP found to have anterolateral STEMI s/p SHILO to mLAD.    #Anterolateral STEMI s/p SHILO to mLAD  Pt with substernal CP and EKG with FREDERICK on V1-V3. s/p Asa 325 prior to arrival to the ED. Pt went to cath lab and LHC showing 100% occlusion of mid LAD, thrombotic s/p SHILO and Nitroglycerin. Troponin 91. Pro-. Post-revascularization EKG with FREDERICK in V1 and V2, TWI in V1-V3. DC'd Entresto as pt has JUANITA  Continue with toprol 25; ASA 81qd, atorvastatin 40mg, brilinta 80mg qd. Follow up with your outpatient provider    #HFrEF  TTE (1/4/23) showing Left ventricular systolic function is severely reduced w/ EF of 25-30% with regional wall motion abnormalities. Continue with toprol 25 and losartan 25mg  - start ACEi/ARB    #Hypothyroidism  Home meds: synthroid 88mg  Continue with home meds    #Pre-diabetes  A1c 6.0  Continue with lifestyle modifications. Follow up with outpatient provider    New medications: ASA, statin, Metoprolol succinate, losartan  Labs to be followed outpatient: A1c in three months  Exam to be followed outpatient: none    Physical exam at the time of discharge:  General: NAD, lying in bed comfortably  Head: NC/AT; MMM; PERRL; EOMI;  Neck: Supple; no JVD  Respiratory: CTAB; no wheezes/rales/rhonchi  Cardiovascular: Regular rhythm/rate; S1/S2+, no murmurs, rubs gallops   Gastrointestinal: Soft; NTND; bowel sounds normal and present  Extremities: WWP; no edema/cyanosis  Neurological: A&Ox3, answering all questions appropriately       #Discharge: do not delete    88F with a PMHx hypothyroidism, HLD, HFrEF (25-30%) p/w 1 hour of CP found to have anterolateral STEMI s/p SHILO to mLAD.    #Anterolateral STEMI s/p SHILO to mLAD  Pt with substernal CP and EKG with FREDERICK on V1-V3. s/p Asa 325 prior to arrival to the ED. Pt went to cath lab and LHC showing 100% occlusion of mid LAD, thrombotic s/p SHILO and Nitroglycerin. Troponin 91. Pro-. Post-revascularization EKG with FREDERICK in V1 and V2, TWI in V1-V3. DC'd Entresto as pt has JUANITA  Continue with toprol 25; ASA 81qd, atorvastatin 40mg, brilinta 90mg BID. Follow up with your outpatient provider    #HFrEF  TTE (1/4/23) showing Left ventricular systolic function is severely reduced w/ EF of 25-30% with regional wall motion abnormalities. Continue with toprol 25 and losartan 25mg  - start ACEi/ARB    #Hypothyroidism  Home meds: synthroid 88mg  Continue with home meds    #Pre-diabetes  A1c 6.0  Continue with lifestyle modifications. Follow up with outpatient provider    New medications: ASA, statin, Metoprolol succinate, losartan  Labs to be followed outpatient: A1c in three months  Exam to be followed outpatient: none    Physical exam at the time of discharge:  General: NAD, lying in bed comfortably  Head: NC/AT; MMM; PERRL; EOMI;  Neck: Supple; no JVD  Respiratory: CTAB; no wheezes/rales/rhonchi  Cardiovascular: Regular rhythm/rate; S1/S2+, no murmurs, rubs gallops   Gastrointestinal: Soft; NTND; bowel sounds normal and present  Extremities: WWP; no edema/cyanosis  Neurological: A&Ox3, answering all questions appropriately

## 2024-01-04 NOTE — H&P ADULT - NSHPSOCIALHISTORY_GEN_ALL_CORE
Pt lives alone in the [facility] and does not use any assistive devices to get around Pt lives alone in the [facility] and does not use any assistive devices to get around

## 2024-01-04 NOTE — DISCHARGE NOTE PROVIDER - NSDCCPTREATMENT_GEN_ALL_CORE_FT
PRINCIPAL PROCEDURE  Procedure: PCI, with stent insertion  Findings and Treatment: LHC: 100% occlusion of mid LAD, thrombotic s/p SHILO and Nitroglycerin.      SECONDARY PROCEDURE  Procedure: Transthoracic echocardiography (TTE)  Findings and Treatment: CONCLUSIONS:   1. Normal left ventricular size.   2. Left ventricular systolic function is severely reduced with a   calculated ejection fraction of 25-30% with regional wall motion   abnormalities.   3. Normal right ventricular size with mildly reduced right ventricular   systolic function.   4. Normal atria.   5. Aortic sclerosis without significant stenosis.   6. No evidence of pulmonary hypertension.   7. No pericardial effusion.   8. No prior echo is available for comparison.

## 2024-01-04 NOTE — ED PROVIDER NOTE - PHYSICAL EXAMINATION
GEN: Elderly, well developed, awake, alert, oriented to person, place, time/situation and in no apparent distress. NTAF  ENT: Airway patent, Nasal mucosa clear. Mouth with normal mucosa.  EYES: Clear bilaterally. PERRL, EOMI  RESPIRATORY: Breathing comfortably with normal RR. No W/C/R, no hypoxia or resp distress.  CARDIAC: Regular rate and rhythm, no M/R/G  ABDOMEN: Soft, nontender, +bowel sounds, no rebound, rigidity, or guarding.  MSK: Range of motion is not limited, no deformities noted.  NEURO: Alert and oriented, no focal deficits.  SKIN: Skin normal color for race, warm, dry and intact. No evidence of rash.  PSYCH: Alert and oriented to person, place, time/situation. somewhat anxious mood and affect. no apparent risk to self or others.

## 2024-01-04 NOTE — ED CLERICAL - CLERICAL COMMENTS
Dr. Cortez called stemi cath lab code @[id number] [facility] [address]. Cortez called stemi cath lab code @[id number] [facility]

## 2024-01-04 NOTE — DISCHARGE NOTE PROVIDER - ATTENDING DISCHARGE PHYSICAL EXAMINATION:
GENERAL: No acute distress, well-developed  HEAD:  Atraumatic, Normocephalic  ENT: EOMI, PERRLA, conjunctiva and sclera clear, Neck supple, No JVD, moist mucosa  CHEST/LUNG: Clear to auscultation bilaterally; No wheeze, equal breath sounds bilaterally   BACK: No spinal tenderness  HEART: Regular rate and rhythm; No murmurs, rubs, or gallops  ABDOMEN: Soft, Nontender, Nondistended; Bowel sounds present  EXTREMITIES:  No clubbing, cyanosis, or edema  PSYCH: Nl behavior, nl affect  NEUROLOGY: AAOx3, non-focal, cranial nerves intact  SKIN: Normal color, No rashes or lesions

## 2024-01-04 NOTE — DISCHARGE NOTE PROVIDER - CARE PROVIDER_API CALL
Kel Carter  Cardiovascular Disease  [address] [address] [place] [address], 4[address] [place] [address]  Phone: [phone]  Fax: [phone]  Follow Up Time: 1 week   Kel Carter  Cardiovascular Disease  [address] [address] [place] [address], 4th [place] [address]  Phone: [phone]  Fax: [phone]  Follow Up Time: 1 week   FELIX BAKER  [address] [facility] [address] Floor 1  [phone]  Follow up in 1 week  Phone: (   )    -  Fax: (   )    -  Follow Up Time: 1 week   FELIX BAKER  [address] [facility] [address] Floor 1  [phone]  Follow up in 1 week  Phone: (   )    -  Fax: (   )    -  Follow Up Time: 1 week

## 2024-01-04 NOTE — DISCHARGE NOTE PROVIDER - NSDCMRMEDTOKEN_GEN_ALL_CORE_FT
Synthroid 88 mcg (0.088 mg) oral tablet: 1 tab(s) orally once a day   Aspirin Low Dose 81 mg oral tablet, chewable: 1 tab(s) orally every 24 hours  Brilinta (ticagrelor) 90 mg oral tablet: 1 tab(s) orally every 12 hours  Lipitor 40 mg oral tablet: 1 tab(s) orally once a day (at bedtime)  Metoprolol Succinate ER 25 mg oral tablet, extended release: 1 tab(s) orally every 24 hours  Synthroid 88 mcg (0.088 mg) oral tablet: 1 tab(s) orally once a day   Aspirin Low Dose 81 mg oral tablet, chewable: 1 tab(s) orally every 24 hours  Brilinta (ticagrelor) 90 mg oral tablet: 1 tab(s) orally every 12 hours  Lipitor 40 mg oral tablet: 1 tab(s) orally once a day (at bedtime)  losartan 25 mg oral tablet: 1 tab(s) orally once a day  Metoprolol Succinate ER 25 mg oral tablet, extended release: 1 tab(s) orally every 24 hours  Synthroid 88 mcg (0.088 mg) oral tablet: 1 tab(s) orally once a day

## 2024-01-04 NOTE — ED ADULT TRIAGE NOTE - CHIEF COMPLAINT QUOTE
Pt aaox3 c/o midsternal CP that started when the pt was at the [facility]. Pt has 1x episode of N/V and diarrhea. Pt placed on 2L NC and given 324 asprin by EMS PTA. Pt aaox3 c/o midsternal CP that started when the pt was at the [facility]. Pt has 1x episode of N/V and diarrhea. Pt placed on 2L NC and given 324 asprin by EMS PTA.

## 2024-01-04 NOTE — ED PROVIDER NOTE - CLINICAL SUMMARY MEDICAL DECISION MAKING FREE TEXT BOX
88F with a PMHx hypothyroidism who was BIBEMS as STEMI. Pt was getting ready for a workout class at 10am when she had n/v/diarrhea followed by substernal chest pain. EMR noted FREDERICK in V1, V2, V3 and gave her asa 324 PTA. On arrival to the ER, pt states CP has improved and is now 1/10. She denies hx of CAD. No SOB, LH, dizziness or syncope. Cath lab code called immediately upon arrival to the ER.  On arrival VSS, d/w interventional attg and cath lab and cardiology fellow was later down to the ER to transport patient emergently up to cath lab.

## 2024-01-04 NOTE — PATIENT PROFILE ADULT - FALL HARM RISK - HARM RISK INTERVENTIONS
Assistance OOB with selected safe patient handling equipment/Communicate Risk of Fall with Harm to all staff/Discuss with provider need for PT consult/Monitor gait and stability/Provide patient with walking aids - walker, cane, crutches/Reinforce activity limits and safety measures with patient and family/Tailored Fall Risk Interventions/Visual Cue: Yellow wristband and red socks/Bed in lowest position, wheels locked, appropriate side rails in place/Call bell, personal items and telephone in reach/Instruct patient to call for assistance before getting out of bed or chair/Non-slip footwear when patient is out of bed/[place] to call system/Physically safe environment - no spills, clutter or unnecessary equipment/Purposeful Proactive Rounding/Room/bathroom lighting operational, light cord in reach Assistance OOB with selected safe patient handling equipment/Communicate Risk of Fall with Harm to all staff/Discuss with provider need for PT consult/Monitor gait and stability/Provide patient with walking aids - walker, cane, crutches/Reinforce activity limits and safety measures with patient and family/Tailored Fall Risk Interventions/Visual Cue: Yellow wristband and red socks/Bed in lowest position, wheels locked, appropriate side rails in place/Call bell, personal items and telephone in reach/Instruct patient to call for assistance before getting out of bed or chair/Non-slip footwear when patient is out of bed/[place] to call system/Physically safe environment - no spills, clutter or unnecessary equipment/Purposeful Proactive Rounding/Room/bathroom lighting operational, light cord in reach

## 2024-01-04 NOTE — H&P ADULT - NSHPPHYSICALEXAM_GEN_ALL_CORE
GENERAL: NAD, lying in bed comfortably  HEAD:  Atraumatic, normocephalic  EYES: EOMI, PERRLA, conjunctiva and sclera clear  NECK: Supple, trachea midline, no JVD  HEART: Regular rate and rhythm, no murmurs, rubs, or gallops  LUNGS: Unlabored respirations.  Clear to auscultation bilaterally, no crackles, wheezing, or rhonchi  ABDOMEN: Soft, nontender, nondistended, +BS  EXTREMITIES: 2+ peripheral pulses bilaterally. No clubbing, cyanosis, or edema  NERVOUS SYSTEM:  A&Ox3, moving all extremities, no focal deficits   SKIN: No rashes or lesions GENERAL: NAD, lying in bed comfortably  HEAD:  Atraumatic, normocephalic  EYES: EOMI, PERRLA, conjunctiva and sclera clear  NECK: Supple, trachea midline, no JVD  HEART: Regular rate and rhythm, no murmurs, rubs, or gallops  LUNGS: Unlabored respirations.  Clear to auscultation bilaterally, no crackles, wheezing, or rhonchi  ABDOMEN: Soft, nontender, nondistended, +BS  EXTREMITIES: 2+ peripheral pulses bilaterally. No clubbing, cyanosis, or edema  NERVOUS SYSTEM:  A&Ox3, answering and conversing appropriately  SKIN: No rashes or lesions

## 2024-01-05 DIAGNOSIS — E03.9 HYPOTHYROIDISM, UNSPECIFIED: ICD-10-CM

## 2024-01-05 DIAGNOSIS — Z29.9 ENCOUNTER FOR PROPHYLACTIC MEASURES, UNSPECIFIED: ICD-10-CM

## 2024-01-05 DIAGNOSIS — I50.20 UNSPECIFIED SYSTOLIC (CONGESTIVE) HEART FAILURE: ICD-10-CM

## 2024-01-05 DIAGNOSIS — N17.9 ACUTE KIDNEY FAILURE, UNSPECIFIED: ICD-10-CM

## 2024-01-05 DIAGNOSIS — I21.02 ST ELEVATION (STEMI) MYOCARDIAL INFARCTION INVOLVING LEFT ANTERIOR DESCENDING CORONARY ARTERY: ICD-10-CM

## 2024-01-05 DIAGNOSIS — E78.5 HYPERLIPIDEMIA, UNSPECIFIED: ICD-10-CM

## 2024-01-05 LAB
ALBUMIN SERPL ELPH-MCNC: 3.3 G/DL — SIGNIFICANT CHANGE UP (ref 3.3–5)
ALBUMIN SERPL ELPH-MCNC: 3.3 G/DL — SIGNIFICANT CHANGE UP (ref 3.3–5)
ALBUMIN SERPL ELPH-MCNC: 3.4 G/DL — SIGNIFICANT CHANGE UP (ref 3.3–5)
ALBUMIN SERPL ELPH-MCNC: 3.4 G/DL — SIGNIFICANT CHANGE UP (ref 3.3–5)
ALP SERPL-CCNC: 129 U/L — HIGH (ref 40–120)
ALP SERPL-CCNC: 129 U/L — HIGH (ref 40–120)
ALP SERPL-CCNC: 57 U/L — SIGNIFICANT CHANGE UP (ref 40–120)
ALP SERPL-CCNC: 57 U/L — SIGNIFICANT CHANGE UP (ref 40–120)
ALT FLD-CCNC: 24 U/L — SIGNIFICANT CHANGE UP (ref 10–45)
ALT FLD-CCNC: 24 U/L — SIGNIFICANT CHANGE UP (ref 10–45)
ALT FLD-CCNC: 35 U/L — SIGNIFICANT CHANGE UP (ref 10–45)
ALT FLD-CCNC: 35 U/L — SIGNIFICANT CHANGE UP (ref 10–45)
ANION GAP SERPL CALC-SCNC: 12 MMOL/L — SIGNIFICANT CHANGE UP (ref 5–17)
APPEARANCE UR: CLEAR — SIGNIFICANT CHANGE UP
APPEARANCE UR: CLEAR — SIGNIFICANT CHANGE UP
APTT BLD: 34.3 SEC — SIGNIFICANT CHANGE UP (ref 24.5–35.6)
APTT BLD: 34.3 SEC — SIGNIFICANT CHANGE UP (ref 24.5–35.6)
AST SERPL-CCNC: 158 U/L — HIGH (ref 10–40)
AST SERPL-CCNC: 158 U/L — HIGH (ref 10–40)
AST SERPL-CCNC: 43 U/L — HIGH (ref 10–40)
AST SERPL-CCNC: 43 U/L — HIGH (ref 10–40)
BACTERIA # UR AUTO: ABNORMAL /HPF
BACTERIA # UR AUTO: ABNORMAL /HPF
BASOPHILS # BLD AUTO: 0.03 K/UL — SIGNIFICANT CHANGE UP (ref 0–0.2)
BASOPHILS # BLD AUTO: 0.03 K/UL — SIGNIFICANT CHANGE UP (ref 0–0.2)
BASOPHILS # BLD AUTO: 0.05 K/UL — SIGNIFICANT CHANGE UP (ref 0–0.2)
BASOPHILS # BLD AUTO: 0.05 K/UL — SIGNIFICANT CHANGE UP (ref 0–0.2)
BASOPHILS NFR BLD AUTO: 0.3 % — SIGNIFICANT CHANGE UP (ref 0–2)
BASOPHILS NFR BLD AUTO: 0.3 % — SIGNIFICANT CHANGE UP (ref 0–2)
BASOPHILS NFR BLD AUTO: 0.8 % — SIGNIFICANT CHANGE UP (ref 0–2)
BASOPHILS NFR BLD AUTO: 0.8 % — SIGNIFICANT CHANGE UP (ref 0–2)
BILIRUB SERPL-MCNC: 0.8 MG/DL — SIGNIFICANT CHANGE UP (ref 0.2–1.2)
BILIRUB SERPL-MCNC: 0.8 MG/DL — SIGNIFICANT CHANGE UP (ref 0.2–1.2)
BILIRUB SERPL-MCNC: 1.5 MG/DL — HIGH (ref 0.2–1.2)
BILIRUB SERPL-MCNC: 1.5 MG/DL — HIGH (ref 0.2–1.2)
BILIRUB UR-MCNC: NEGATIVE — SIGNIFICANT CHANGE UP
BILIRUB UR-MCNC: NEGATIVE — SIGNIFICANT CHANGE UP
BLD GP AB SCN SERPL QL: NEGATIVE — SIGNIFICANT CHANGE UP
BLD GP AB SCN SERPL QL: NEGATIVE — SIGNIFICANT CHANGE UP
BUN SERPL-MCNC: 27 MG/DL — HIGH (ref 7–23)
BUN SERPL-MCNC: 27 MG/DL — HIGH (ref 7–23)
BUN SERPL-MCNC: 44 MG/DL — HIGH (ref 7–23)
BUN SERPL-MCNC: 44 MG/DL — HIGH (ref 7–23)
CALCIUM SERPL-MCNC: 8.8 MG/DL — SIGNIFICANT CHANGE UP (ref 8.4–10.5)
CALCIUM SERPL-MCNC: 8.8 MG/DL — SIGNIFICANT CHANGE UP (ref 8.4–10.5)
CALCIUM SERPL-MCNC: 9.3 MG/DL — SIGNIFICANT CHANGE UP (ref 8.4–10.5)
CALCIUM SERPL-MCNC: 9.3 MG/DL — SIGNIFICANT CHANGE UP (ref 8.4–10.5)
CAST: 5 /LPF — HIGH (ref 0–4)
CAST: 5 /LPF — HIGH (ref 0–4)
CHLORIDE SERPL-SCNC: 102 MMOL/L — SIGNIFICANT CHANGE UP (ref 96–108)
CHLORIDE SERPL-SCNC: 102 MMOL/L — SIGNIFICANT CHANGE UP (ref 96–108)
CHLORIDE SERPL-SCNC: 104 MMOL/L — SIGNIFICANT CHANGE UP (ref 96–108)
CHLORIDE SERPL-SCNC: 104 MMOL/L — SIGNIFICANT CHANGE UP (ref 96–108)
CK MB CFR SERPL CALC: 47.7 NG/ML — HIGH (ref 0–6.7)
CK MB CFR SERPL CALC: 47.7 NG/ML — HIGH (ref 0–6.7)
CK MB CFR SERPL CALC: 5.8 NG/ML — SIGNIFICANT CHANGE UP (ref 0–6.7)
CK MB CFR SERPL CALC: 5.8 NG/ML — SIGNIFICANT CHANGE UP (ref 0–6.7)
CK SERPL-CCNC: 221 U/L — HIGH (ref 25–170)
CK SERPL-CCNC: 221 U/L — HIGH (ref 25–170)
CK SERPL-CCNC: 827 U/L — HIGH (ref 25–170)
CK SERPL-CCNC: 827 U/L — HIGH (ref 25–170)
CO2 SERPL-SCNC: 21 MMOL/L — LOW (ref 22–31)
CO2 SERPL-SCNC: 21 MMOL/L — LOW (ref 22–31)
CO2 SERPL-SCNC: 24 MMOL/L — SIGNIFICANT CHANGE UP (ref 22–31)
CO2 SERPL-SCNC: 24 MMOL/L — SIGNIFICANT CHANGE UP (ref 22–31)
COLOR SPEC: YELLOW — SIGNIFICANT CHANGE UP
COLOR SPEC: YELLOW — SIGNIFICANT CHANGE UP
CREAT ?TM UR-MCNC: 175 MG/DL — SIGNIFICANT CHANGE UP
CREAT ?TM UR-MCNC: 175 MG/DL — SIGNIFICANT CHANGE UP
CREAT SERPL-MCNC: 1.06 MG/DL — SIGNIFICANT CHANGE UP (ref 0.5–1.3)
CREAT SERPL-MCNC: 1.06 MG/DL — SIGNIFICANT CHANGE UP (ref 0.5–1.3)
CREAT SERPL-MCNC: 1.31 MG/DL — HIGH (ref 0.5–1.3)
CREAT SERPL-MCNC: 1.31 MG/DL — HIGH (ref 0.5–1.3)
DIFF PNL FLD: ABNORMAL
DIFF PNL FLD: ABNORMAL
EGFR: 39 ML/MIN/1.73M2 — LOW
EGFR: 39 ML/MIN/1.73M2 — LOW
EGFR: 51 ML/MIN/1.73M2 — LOW
EGFR: 51 ML/MIN/1.73M2 — LOW
EOSINOPHIL # BLD AUTO: 0.01 K/UL — SIGNIFICANT CHANGE UP (ref 0–0.5)
EOSINOPHIL # BLD AUTO: 0.01 K/UL — SIGNIFICANT CHANGE UP (ref 0–0.5)
EOSINOPHIL # BLD AUTO: 0.14 K/UL — SIGNIFICANT CHANGE UP (ref 0–0.5)
EOSINOPHIL # BLD AUTO: 0.14 K/UL — SIGNIFICANT CHANGE UP (ref 0–0.5)
EOSINOPHIL NFR BLD AUTO: 0.2 % — SIGNIFICANT CHANGE UP (ref 0–6)
EOSINOPHIL NFR BLD AUTO: 0.2 % — SIGNIFICANT CHANGE UP (ref 0–6)
EOSINOPHIL NFR BLD AUTO: 1.5 % — SIGNIFICANT CHANGE UP (ref 0–6)
EOSINOPHIL NFR BLD AUTO: 1.5 % — SIGNIFICANT CHANGE UP (ref 0–6)
GLUCOSE SERPL-MCNC: 129 MG/DL — HIGH (ref 70–99)
GLUCOSE SERPL-MCNC: 129 MG/DL — HIGH (ref 70–99)
GLUCOSE SERPL-MCNC: 137 MG/DL — HIGH (ref 70–99)
GLUCOSE SERPL-MCNC: 137 MG/DL — HIGH (ref 70–99)
GLUCOSE UR QL: NEGATIVE MG/DL — SIGNIFICANT CHANGE UP
GLUCOSE UR QL: NEGATIVE MG/DL — SIGNIFICANT CHANGE UP
HCT VFR BLD CALC: 33.7 % — LOW (ref 34.5–45)
HCT VFR BLD CALC: 33.7 % — LOW (ref 34.5–45)
HCT VFR BLD CALC: 38.3 % — SIGNIFICANT CHANGE UP (ref 34.5–45)
HCT VFR BLD CALC: 38.3 % — SIGNIFICANT CHANGE UP (ref 34.5–45)
HGB BLD-MCNC: 10.8 G/DL — LOW (ref 11.5–15.5)
HGB BLD-MCNC: 10.8 G/DL — LOW (ref 11.5–15.5)
HGB BLD-MCNC: 12.3 G/DL — SIGNIFICANT CHANGE UP (ref 11.5–15.5)
HGB BLD-MCNC: 12.3 G/DL — SIGNIFICANT CHANGE UP (ref 11.5–15.5)
IMM GRANULOCYTES NFR BLD AUTO: 0.3 % — SIGNIFICANT CHANGE UP (ref 0–0.9)
INR BLD: 2.96 — HIGH (ref 0.85–1.18)
INR BLD: 2.96 — HIGH (ref 0.85–1.18)
KETONES UR-MCNC: NEGATIVE MG/DL — SIGNIFICANT CHANGE UP
KETONES UR-MCNC: NEGATIVE MG/DL — SIGNIFICANT CHANGE UP
LACTATE SERPL-SCNC: 1.8 MMOL/L — SIGNIFICANT CHANGE UP (ref 0.5–2)
LACTATE SERPL-SCNC: 1.8 MMOL/L — SIGNIFICANT CHANGE UP (ref 0.5–2)
LEUKOCYTE ESTERASE UR-ACNC: ABNORMAL
LEUKOCYTE ESTERASE UR-ACNC: ABNORMAL
LYMPHOCYTES # BLD AUTO: 0.99 K/UL — LOW (ref 1–3.3)
LYMPHOCYTES # BLD AUTO: 0.99 K/UL — LOW (ref 1–3.3)
LYMPHOCYTES # BLD AUTO: 15.2 % — SIGNIFICANT CHANGE UP (ref 13–44)
LYMPHOCYTES # BLD AUTO: 15.2 % — SIGNIFICANT CHANGE UP (ref 13–44)
LYMPHOCYTES # BLD AUTO: 2.27 K/UL — SIGNIFICANT CHANGE UP (ref 1–3.3)
LYMPHOCYTES # BLD AUTO: 2.27 K/UL — SIGNIFICANT CHANGE UP (ref 1–3.3)
LYMPHOCYTES # BLD AUTO: 23.8 % — SIGNIFICANT CHANGE UP (ref 13–44)
LYMPHOCYTES # BLD AUTO: 23.8 % — SIGNIFICANT CHANGE UP (ref 13–44)
MAGNESIUM SERPL-MCNC: 2.1 MG/DL — SIGNIFICANT CHANGE UP (ref 1.6–2.6)
MCHC RBC-ENTMCNC: 29.9 PG — SIGNIFICANT CHANGE UP (ref 27–34)
MCHC RBC-ENTMCNC: 29.9 PG — SIGNIFICANT CHANGE UP (ref 27–34)
MCHC RBC-ENTMCNC: 31.9 PG — SIGNIFICANT CHANGE UP (ref 27–34)
MCHC RBC-ENTMCNC: 31.9 PG — SIGNIFICANT CHANGE UP (ref 27–34)
MCHC RBC-ENTMCNC: 32 GM/DL — SIGNIFICANT CHANGE UP (ref 32–36)
MCHC RBC-ENTMCNC: 32 GM/DL — SIGNIFICANT CHANGE UP (ref 32–36)
MCHC RBC-ENTMCNC: 32.1 GM/DL — SIGNIFICANT CHANGE UP (ref 32–36)
MCHC RBC-ENTMCNC: 32.1 GM/DL — SIGNIFICANT CHANGE UP (ref 32–36)
MCV RBC AUTO: 93.4 FL — SIGNIFICANT CHANGE UP (ref 80–100)
MCV RBC AUTO: 93.4 FL — SIGNIFICANT CHANGE UP (ref 80–100)
MCV RBC AUTO: 99.5 FL — SIGNIFICANT CHANGE UP (ref 80–100)
MCV RBC AUTO: 99.5 FL — SIGNIFICANT CHANGE UP (ref 80–100)
MONOCYTES # BLD AUTO: 0.78 K/UL — SIGNIFICANT CHANGE UP (ref 0–0.9)
MONOCYTES # BLD AUTO: 0.78 K/UL — SIGNIFICANT CHANGE UP (ref 0–0.9)
MONOCYTES # BLD AUTO: 1.03 K/UL — HIGH (ref 0–0.9)
MONOCYTES # BLD AUTO: 1.03 K/UL — HIGH (ref 0–0.9)
MONOCYTES NFR BLD AUTO: 10.8 % — SIGNIFICANT CHANGE UP (ref 2–14)
MONOCYTES NFR BLD AUTO: 10.8 % — SIGNIFICANT CHANGE UP (ref 2–14)
MONOCYTES NFR BLD AUTO: 12 % — SIGNIFICANT CHANGE UP (ref 2–14)
MONOCYTES NFR BLD AUTO: 12 % — SIGNIFICANT CHANGE UP (ref 2–14)
MUCOUS THREADS # UR AUTO: PRESENT
MUCOUS THREADS # UR AUTO: PRESENT
NEUTROPHILS # BLD AUTO: 4.66 K/UL — SIGNIFICANT CHANGE UP (ref 1.8–7.4)
NEUTROPHILS # BLD AUTO: 4.66 K/UL — SIGNIFICANT CHANGE UP (ref 1.8–7.4)
NEUTROPHILS # BLD AUTO: 6.02 K/UL — SIGNIFICANT CHANGE UP (ref 1.8–7.4)
NEUTROPHILS # BLD AUTO: 6.02 K/UL — SIGNIFICANT CHANGE UP (ref 1.8–7.4)
NEUTROPHILS NFR BLD AUTO: 63.3 % — SIGNIFICANT CHANGE UP (ref 43–77)
NEUTROPHILS NFR BLD AUTO: 63.3 % — SIGNIFICANT CHANGE UP (ref 43–77)
NEUTROPHILS NFR BLD AUTO: 71.5 % — SIGNIFICANT CHANGE UP (ref 43–77)
NEUTROPHILS NFR BLD AUTO: 71.5 % — SIGNIFICANT CHANGE UP (ref 43–77)
NITRITE UR-MCNC: NEGATIVE — SIGNIFICANT CHANGE UP
NITRITE UR-MCNC: NEGATIVE — SIGNIFICANT CHANGE UP
NRBC # BLD: 0 /100 WBCS — SIGNIFICANT CHANGE UP (ref 0–0)
OSMOLALITY UR: 621 MOSM/KG — SIGNIFICANT CHANGE UP (ref 300–900)
OSMOLALITY UR: 621 MOSM/KG — SIGNIFICANT CHANGE UP (ref 300–900)
PH UR: 5.5 — SIGNIFICANT CHANGE UP (ref 5–8)
PH UR: 5.5 — SIGNIFICANT CHANGE UP (ref 5–8)
PHOSPHATE SERPL-MCNC: 2.8 MG/DL — SIGNIFICANT CHANGE UP (ref 2.5–4.5)
PHOSPHATE SERPL-MCNC: 2.8 MG/DL — SIGNIFICANT CHANGE UP (ref 2.5–4.5)
PHOSPHATE SERPL-MCNC: 4.2 MG/DL — SIGNIFICANT CHANGE UP (ref 2.5–4.5)
PHOSPHATE SERPL-MCNC: 4.2 MG/DL — SIGNIFICANT CHANGE UP (ref 2.5–4.5)
PLATELET # BLD AUTO: 128 K/UL — LOW (ref 150–400)
PLATELET # BLD AUTO: 128 K/UL — LOW (ref 150–400)
PLATELET # BLD AUTO: 296 K/UL — SIGNIFICANT CHANGE UP (ref 150–400)
PLATELET # BLD AUTO: 296 K/UL — SIGNIFICANT CHANGE UP (ref 150–400)
POTASSIUM SERPL-MCNC: 4 MMOL/L — SIGNIFICANT CHANGE UP (ref 3.5–5.3)
POTASSIUM SERPL-MCNC: 4 MMOL/L — SIGNIFICANT CHANGE UP (ref 3.5–5.3)
POTASSIUM SERPL-MCNC: 4.5 MMOL/L — SIGNIFICANT CHANGE UP (ref 3.5–5.3)
POTASSIUM SERPL-MCNC: 4.5 MMOL/L — SIGNIFICANT CHANGE UP (ref 3.5–5.3)
POTASSIUM SERPL-SCNC: 4 MMOL/L — SIGNIFICANT CHANGE UP (ref 3.5–5.3)
POTASSIUM SERPL-SCNC: 4 MMOL/L — SIGNIFICANT CHANGE UP (ref 3.5–5.3)
POTASSIUM SERPL-SCNC: 4.5 MMOL/L — SIGNIFICANT CHANGE UP (ref 3.5–5.3)
POTASSIUM SERPL-SCNC: 4.5 MMOL/L — SIGNIFICANT CHANGE UP (ref 3.5–5.3)
PROT ?TM UR-MCNC: 26 MG/DL — HIGH (ref 0–12)
PROT ?TM UR-MCNC: 26 MG/DL — HIGH (ref 0–12)
PROT SERPL-MCNC: 6.5 G/DL — SIGNIFICANT CHANGE UP (ref 6–8.3)
PROT SERPL-MCNC: 6.5 G/DL — SIGNIFICANT CHANGE UP (ref 6–8.3)
PROT SERPL-MCNC: 6.7 G/DL — SIGNIFICANT CHANGE UP (ref 6–8.3)
PROT SERPL-MCNC: 6.7 G/DL — SIGNIFICANT CHANGE UP (ref 6–8.3)
PROT UR-MCNC: 30 MG/DL
PROT UR-MCNC: 30 MG/DL
PROT/CREAT UR-RTO: 0.1 RATIO — SIGNIFICANT CHANGE UP (ref 0–0.2)
PROT/CREAT UR-RTO: 0.1 RATIO — SIGNIFICANT CHANGE UP (ref 0–0.2)
PROTHROM AB SERPL-ACNC: 32.7 SEC — HIGH (ref 9.5–13)
PROTHROM AB SERPL-ACNC: 32.7 SEC — HIGH (ref 9.5–13)
RBC # BLD: 3.61 M/UL — LOW (ref 3.8–5.2)
RBC # BLD: 3.61 M/UL — LOW (ref 3.8–5.2)
RBC # BLD: 3.85 M/UL — SIGNIFICANT CHANGE UP (ref 3.8–5.2)
RBC # BLD: 3.85 M/UL — SIGNIFICANT CHANGE UP (ref 3.8–5.2)
RBC # FLD: 13.9 % — SIGNIFICANT CHANGE UP (ref 10.3–14.5)
RBC # FLD: 13.9 % — SIGNIFICANT CHANGE UP (ref 10.3–14.5)
RBC # FLD: 16.2 % — HIGH (ref 10.3–14.5)
RBC # FLD: 16.2 % — HIGH (ref 10.3–14.5)
RBC CASTS # UR COMP ASSIST: 5 /HPF — HIGH (ref 0–4)
RBC CASTS # UR COMP ASSIST: 5 /HPF — HIGH (ref 0–4)
RENAL EPI CELLS # UR COMP ASSIST: PRESENT
RENAL EPI CELLS # UR COMP ASSIST: PRESENT
RH IG SCN BLD-IMP: POSITIVE — SIGNIFICANT CHANGE UP
RH IG SCN BLD-IMP: POSITIVE — SIGNIFICANT CHANGE UP
SODIUM SERPL-SCNC: 137 MMOL/L — SIGNIFICANT CHANGE UP (ref 135–145)
SODIUM SERPL-SCNC: 137 MMOL/L — SIGNIFICANT CHANGE UP (ref 135–145)
SODIUM SERPL-SCNC: 138 MMOL/L — SIGNIFICANT CHANGE UP (ref 135–145)
SODIUM SERPL-SCNC: 138 MMOL/L — SIGNIFICANT CHANGE UP (ref 135–145)
SODIUM UR-SCNC: 20 MMOL/L — SIGNIFICANT CHANGE UP
SODIUM UR-SCNC: 20 MMOL/L — SIGNIFICANT CHANGE UP
SP GR SPEC: 1.02 — SIGNIFICANT CHANGE UP (ref 1–1.03)
SP GR SPEC: 1.02 — SIGNIFICANT CHANGE UP (ref 1–1.03)
SQUAMOUS # UR AUTO: 3 /HPF — SIGNIFICANT CHANGE UP (ref 0–5)
SQUAMOUS # UR AUTO: 3 /HPF — SIGNIFICANT CHANGE UP (ref 0–5)
TROPONIN T, HIGH SENSITIVITY RESULT: 1520 NG/L — CRITICAL HIGH (ref 0–51)
TROPONIN T, HIGH SENSITIVITY RESULT: 1520 NG/L — CRITICAL HIGH (ref 0–51)
TROPONIN T, HIGH SENSITIVITY RESULT: 44 NG/L — SIGNIFICANT CHANGE UP (ref 0–51)
TROPONIN T, HIGH SENSITIVITY RESULT: 44 NG/L — SIGNIFICANT CHANGE UP (ref 0–51)
UROBILINOGEN FLD QL: 0.2 MG/DL — SIGNIFICANT CHANGE UP (ref 0.2–1)
UROBILINOGEN FLD QL: 0.2 MG/DL — SIGNIFICANT CHANGE UP (ref 0.2–1)
WBC # BLD: 6.51 K/UL — SIGNIFICANT CHANGE UP (ref 3.8–10.5)
WBC # BLD: 6.51 K/UL — SIGNIFICANT CHANGE UP (ref 3.8–10.5)
WBC # BLD: 9.52 K/UL — SIGNIFICANT CHANGE UP (ref 3.8–10.5)
WBC # BLD: 9.52 K/UL — SIGNIFICANT CHANGE UP (ref 3.8–10.5)
WBC # FLD AUTO: 6.51 K/UL — SIGNIFICANT CHANGE UP (ref 3.8–10.5)
WBC # FLD AUTO: 6.51 K/UL — SIGNIFICANT CHANGE UP (ref 3.8–10.5)
WBC # FLD AUTO: 9.52 K/UL — SIGNIFICANT CHANGE UP (ref 3.8–10.5)
WBC # FLD AUTO: 9.52 K/UL — SIGNIFICANT CHANGE UP (ref 3.8–10.5)
WBC UR QL: 6 /HPF — HIGH (ref 0–5)
WBC UR QL: 6 /HPF — HIGH (ref 0–5)

## 2024-01-05 PROCEDURE — 99291 CRITICAL CARE FIRST HOUR: CPT

## 2024-01-05 PROCEDURE — 71045 X-RAY EXAM CHEST 1 VIEW: CPT | Mod: 26

## 2024-01-05 RX ORDER — ATORVASTATIN CALCIUM 80 MG/1
40 TABLET, FILM COATED ORAL AT BEDTIME
Refills: 0 | Status: DISCONTINUED | OUTPATIENT
Start: 2024-01-05 | End: 2024-01-06

## 2024-01-05 RX ORDER — METOPROLOL TARTRATE 50 MG
25 TABLET ORAL EVERY 24 HOURS
Refills: 0 | Status: DISCONTINUED | OUTPATIENT
Start: 2024-01-05 | End: 2024-01-06

## 2024-01-05 RX ORDER — ASPIRIN/CALCIUM CARB/MAGNESIUM 324 MG
1 TABLET ORAL
Qty: 0 | Refills: 0 | DISCHARGE
Start: 2024-01-05

## 2024-01-05 RX ORDER — METOPROLOL TARTRATE 50 MG
1 TABLET ORAL
Qty: 0 | Refills: 0 | DISCHARGE
Start: 2024-01-05

## 2024-01-05 RX ORDER — ATORVASTATIN CALCIUM 80 MG/1
1 TABLET, FILM COATED ORAL
Qty: 0 | Refills: 0 | DISCHARGE
Start: 2024-01-05

## 2024-01-05 RX ORDER — TICAGRELOR 90 MG/1
1 TABLET ORAL
Qty: 0 | Refills: 0 | DISCHARGE
Start: 2024-01-05

## 2024-01-05 RX ORDER — SODIUM CHLORIDE 9 MG/ML
250 INJECTION INTRAMUSCULAR; INTRAVENOUS; SUBCUTANEOUS ONCE
Refills: 0 | Status: COMPLETED | OUTPATIENT
Start: 2024-01-05 | End: 2024-01-05

## 2024-01-05 RX ADMIN — TICAGRELOR 90 MILLIGRAM(S): 90 TABLET ORAL at 21:59

## 2024-01-05 RX ADMIN — HEPARIN SODIUM 5000 UNIT(S): 5000 INJECTION INTRAVENOUS; SUBCUTANEOUS at 21:59

## 2024-01-05 RX ADMIN — Medication 81 MILLIGRAM(S): at 21:59

## 2024-01-05 RX ADMIN — Medication 88 MICROGRAM(S): at 08:36

## 2024-01-05 RX ADMIN — HEPARIN SODIUM 5000 UNIT(S): 5000 INJECTION INTRAVENOUS; SUBCUTANEOUS at 09:19

## 2024-01-05 RX ADMIN — TICAGRELOR 90 MILLIGRAM(S): 90 TABLET ORAL at 09:19

## 2024-01-05 RX ADMIN — Medication 25 MILLIGRAM(S): at 09:19

## 2024-01-05 RX ADMIN — ATORVASTATIN CALCIUM 40 MILLIGRAM(S): 80 TABLET, FILM COATED ORAL at 21:59

## 2024-01-05 RX ADMIN — SODIUM CHLORIDE 250 MILLILITER(S): 9 INJECTION INTRAMUSCULAR; INTRAVENOUS; SUBCUTANEOUS at 12:52

## 2024-01-05 NOTE — PROGRESS NOTE ADULT - ASSESSMENT
Pt is a 88F with a PMHx hypothyroidism p/w 1 hour of CP found to have anterolateral STEMI s/p SHILO to mLAD    #Neuro  at baseline    Cards  #Anterolateral STEMI  Pt with substernal CP and EKG with FREDERICK on V1-V3. s/p Asa 325 prior to arrival to the ED. Pt went to cath lab and LHC showing 100% occlusion of mid LAD, thrombotic s/p SHILO and Nitroglycerin. Troponin 91. Pro-. Post-revascularization EKG with FREDERICK in V1 and V2, TWI in V1-V3. DC'd Entresto as pt has JUANITA  - c/w ASA 81qd  - c/w statin 80mg   - start brilinta 80mg qd  - consider starting betablocker tmmwr  - f/u A1c, Lipid profile  - trend trops    Pulm  CARMENZA    #Endocrine  hx of hypothyroidism. On synthroid 88mg  - f/u TSH and free T4  - c/w home meds    #GI  CARMENZA    #Renal  JUANITA likely ELLEN  - ctm and trend  - dc'd entresto    #  CARMENZA    ENDO  #Pre-diabetes  A1C 6  - f/u outpatient PCP    #HLD    - c/w statin      Fluids: none  Electrolytes: Mg >2, K >4  Nutrition: Regular diet  Prophylaxis: heparin subq  Activity: as tolerated  GI: none  C: ?  Dispo: CCU       Pt is a 88F with a PMHx hypothyroidism p/w 1 hour of CP found to have anterolateral STEMI s/p SHILO to mLAD    #Neuro  at baseline    Cards  #Anterolateral STEMI  Pt with substernal CP and EKG with FREDERICK on V1-V3. s/p Asa 325 prior to arrival to the ED. Pt went to cath lab and LHC showing 100% occlusion of mid LAD, thrombotic s/p SHILO and Nitroglycerin. Troponin 91. Pro-. Post-revascularization EKG with FREDERICK in V1 and V2, TWI in V1-V3. DC'd Entresto as pt has JUANITA  - start toprol 25  - c/w ASA 81qd  - c/w statin 80mg   - start brilinta 80mg qd  - f/u A1c, Lipid profile  - trend trops    Pulm  CARMENZA    #Endocrine  hx of hypothyroidism. On synthroid 88mg  - f/u TSH and free T4  - c/w home meds    #GI  CARMENZA    #Renal  JUANITA likely ELLEN  - ctm and trend  - dc'd entresto    #  CARMENZA    ENDO  #Pre-diabetes  A1C 6  - f/u outpatient PCP    #HLD    - c/w statin      Fluids: none  Electrolytes: Mg >2, K >4  Nutrition: Regular diet  Prophylaxis: heparin subq  Activity: as tolerated  GI: none  C: ?  Dispo: CCU       Pt is a 88F with a PMHx hypothyroidism p/w 1 hour of CP found to have anterolateral STEMI s/p SHILO to mLAD    #Neuro  at baseline    Cards  #Anterolateral STEMI  Pt with substernal CP and EKG with FREDERICK on V1-V3. s/p Asa 325 prior to arrival to the ED. Pt went to cath lab and LHC showing 100% occlusion of mid LAD, thrombotic s/p SHILO and Nitroglycerin. Troponin 91. Pro-. Post-revascularization EKG with FREDERICK in V1 and V2, TWI in V1-V3. DC'd Entresto as pt has JUANITA  - start toprol 25  - c/w ASA 81qd  - c/w statin 40mg   - start brilinta 80mg qd  - trend trops    Pulm  CARMENZA    #Endocrine  hx of hypothyroidism. On synthroid 88mg  - f/u TSH and free T4  - c/w home meds    #GI  CARMENZA    #Renal  JUANITA likely ELLEN  - ctm and trend  - dc'd entresto    #  CARMENZA    ENDO  #Pre-diabetes  A1C 6  - f/u outpatient PCP    #HLD    - c/w statin 40      Fluids: none  Electrolytes: Mg >2, K >4  Nutrition: Regular diet  Prophylaxis: heparin subq  Activity: as tolerated  GI: none  C: ?  Dispo: CCU       Pt is a 88F with a PMHx hypothyroidism p/w 1 hour of CP found to have anterolateral STEMI s/p SHILO to mLAD    #Neuro  at baseline    Cards  #Anterolateral STEMI  Pt with substernal CP and EKG with FREDERICK on V1-V3. s/p Asa 325 prior to arrival to the ED. Pt went to cath lab and LHC showing 100% occlusion of mid LAD, thrombotic s/p SHILO and Nitroglycerin. Troponin 91. Pro-. Post-revascularization EKG with FREDERICK in V1 and V2, TWI in V1-V3. DC'd Entresto as pt has JAUNITA  - start toprol 25  - c/w ASA 81qd  - c/w statin 40mg   - start brilinta 80mg qd  - trend trops    Pulm  CARMENZA    #Endocrine  hx of hypothyroidism. On synthroid 88mg  - f/u TSH and free T4  - c/w home meds    #GI  CARMENZA    #Renal  JUANITA likely ELLEN  - ctm and trend  - dc'd entresto    #  CARMENZA    ENDO  #Pre-diabetes  A1C 6  - f/u outpatient PCP    #HLD    - c/w statin 40      Fluids: none  Electrolytes: Mg >2, K >4  Nutrition: Regular diet  Prophylaxis: heparin subq  Activity: as tolerated  GI: none  C: ?  Dispo: CCU       Pt is a 88F with a PMHx hypothyroidism p/w 1 hour of CP found to have anterolateral STEMI s/p SHILO to mLAD    #Neuro  at baseline    Cards  #Anterolateral STEMI  Pt with substernal CP and EKG with FREDERICK on V1-V3. s/p Asa 325 prior to arrival to the ED. Pt went to cath lab and LHC showing 100% occlusion of mid LAD, thrombotic s/p SHILO and Nitroglycerin. Troponin 91. Pro-. Post-revascularization EKG with FREDERICK in V1 and V2, TWI in V1-V3. DC'd Entresto as pt has JUANITA  - start toprol 25  - c/w ASA 81qd  - c/w statin 40mg   - start brilinta 80mg qd  - trend trops    #HFrEF  TTE (1/4/23) showing Left ventricular systolic function is severely reduced w/ EF of 25-30% with regional wall motion   abnormalities.  - c/w toprol 25  - start ACEi/ARB when JUANITA clears    Pulm  CARMENZA    #Endocrine  hx of hypothyroidism. On synthroid 88mg  - f/u TSH and free T4  - c/w home meds    #GI  CARMENZA    #Renal  JUANITA likely ELLEN  - ctm and trend  - dc'd entresto    #  CARMENZA    ENDO  #Pre-diabetes  A1C 6  - f/u outpatient PCP    #HLD    - c/w statin 40      Fluids: none  Electrolytes: Mg >2, K >4  Nutrition: Regular diet  Prophylaxis: heparin subq  Activity: as tolerated  GI: none  C: ?  Dispo: CCU

## 2024-01-05 NOTE — PROGRESS NOTE ADULT - NS ATTEND AMEND GEN_ALL_CORE FT
88F with a PMHx hypothyroidism p/w 1 hour of CP found to have anterolateral STEMI s/p SHILO to mLAD, new HFrEF, jake.    1. STEMI LAD   S.p LHC SHILO LAD, continue DAPT and lipitor.  Continue metoprolol.    2. HFrEF, acute  Stage B, ischemic nondilated congestive heart failure with low LVEF, warm and euvolemic, lying down flat.  Continue metoprolol. Add GDMT with valsartan prior to d/c consider MRA/SGTI2 if stays over the weekend.

## 2024-01-05 NOTE — PROGRESS NOTE ADULT - SUBJECTIVE AND OBJECTIVE BOX
O/N Events: NAEO    Subjective/ROS: Patient seen and examined at bedside. Pt feels well and denies Fever/Chills, HA, CP, SOB, n/v, changes in bowel/urinary habits.  12pt ROS otherwise negative.    VITALS  Vital Signs Last 24 Hrs  T(C): 36.9 (05 Jan 2024 05:01), Max: 37.1 (05 Jan 2024 01:01)  T(F): 98.5 (05 Jan 2024 05:01), Max: 98.8 (05 Jan 2024 01:01)  HR: 70 (05 Jan 2024 07:00) (70 - 99)  BP: 112/56 (05 Jan 2024 07:00) (100/59 - 152/74)  BP(mean): 80 (05 Jan 2024 07:00) (76 - 104)  RR: 19 (05 Jan 2024 07:00) (16 - 19)  SpO2: 93% (05 Jan 2024 07:00) (92% - 100%)    Parameters below as of 05 Jan 2024 07:00  Patient On (Oxygen Delivery Method): room air        CAPILLARY BLOOD GLUCOSE        PHYSICAL EXAM  General: NAD, sitting comfortably eating breakfast  Head: NC/AT; MMM; PERRL; EOMI;  Neck: Supple; no JVD  Respiratory: CTAB; no wheezes/rales/rhonchi  Cardiovascular: Regular rhythm/rate; S1/S2+, no murmurs, rubs gallops   Gastrointestinal: Soft; NTND; bowel sounds normal and present  Extremities: WWP; no edema/cyanosis  Neurological: A&Ox3, answering all questions appropriately    MEDICATIONS  (STANDING):  aspirin  chewable 81 milliGRAM(s) Oral every 24 hours  atorvastatin 80 milliGRAM(s) Oral at bedtime  heparin   Injectable 5000 Unit(s) SubCutaneous every 12 hours  levothyroxine 88 MICROGram(s) Oral every 24 hours  ticagrelor 90 milliGRAM(s) Oral every 12 hours    MEDICATIONS  (PRN):      naproxen (Unknown)      LABS                        10.8   6.51  )-----------( 128      ( 05 Jan 2024 05:25 )             33.7     01-05    138  |  102  |  44<H>  ----------------------------<  129<H>  4.5   |  24  |  1.31<H>    Ca    9.3      05 Jan 2024 05:25  Phos  4.2     01-05  Mg     2.1     01-05    TPro  6.5  /  Alb  3.3  /  TBili  1.5<H>  /  DBili  x   /  AST  43<H>  /  ALT  35  /  AlkPhos  129<H>  01-05    PT/INR - ( 05 Jan 2024 05:25 )   PT: 32.7 sec;   INR: 2.96          PTT - ( 05 Jan 2024 05:25 )  PTT:34.3 sec  Urinalysis Basic - ( 05 Jan 2024 05:25 )    Color: x / Appearance: x / SG: x / pH: x  Gluc: 129 mg/dL / Ketone: x  / Bili: x / Urobili: x   Blood: x / Protein: x / Nitrite: x   Leuk Esterase: x / RBC: x / WBC x   Sq Epi: x / Non Sq Epi: x / Bacteria: x      CARDIAC MARKERS ( 04 Jan 2024 16:42 )  x     / x     / 1230 U/L / x     / 216.8 ng/mL  CARDIAC MARKERS ( 04 Jan 2024 13:21 )  x     / x     / 555 U/L / x     / 80.5 ng/mL          IMAGING/EKG/ETC   *****************TRANSFER FROM CCU TO CARDIAC TELE**********************  88F with a PMHx hypothyroidism who was BIBEMS due to n/v/diarrhea followed by substernal chest pain for an hour found to have  Anterolateral STEMI s/p SHILO of mLAD. Pt started on statin 80 changed to 40 (given age), asa 81mg, brilinta 80mg, and toprol 25mg.    O/N Events: NAEO    Subjective/ROS: Patient seen and examined at bedside. Pt feels well and denies Fever/Chills, HA, CP, SOB, n/v, changes in bowel/urinary habits.  12pt ROS otherwise negative.    VITALS  Vital Signs Last 24 Hrs  T(C): 36.9 (05 Jan 2024 05:01), Max: 37.1 (05 Jan 2024 01:01)  T(F): 98.5 (05 Jan 2024 05:01), Max: 98.8 (05 Jan 2024 01:01)  HR: 70 (05 Jan 2024 07:00) (70 - 99)  BP: 112/56 (05 Jan 2024 07:00) (100/59 - 152/74)  BP(mean): 80 (05 Jan 2024 07:00) (76 - 104)  RR: 19 (05 Jan 2024 07:00) (16 - 19)  SpO2: 93% (05 Jan 2024 07:00) (92% - 100%)    Parameters below as of 05 Jan 2024 07:00  Patient On (Oxygen Delivery Method): room air        CAPILLARY BLOOD GLUCOSE        PHYSICAL EXAM  General: NAD, sitting comfortably eating breakfast  Head: NC/AT; MMM; PERRL; EOMI;  Neck: Supple; no JVD  Respiratory: CTAB; no wheezes/rales/rhonchi  Cardiovascular: Regular rhythm/rate; S1/S2+, no murmurs, rubs gallops   Gastrointestinal: Soft; NTND; bowel sounds normal and present  Extremities: WWP; no edema/cyanosis  Neurological: A&Ox3, answering all questions appropriately    MEDICATIONS  (STANDING):  aspirin  chewable 81 milliGRAM(s) Oral every 24 hours  atorvastatin 80 milliGRAM(s) Oral at bedtime  heparin   Injectable 5000 Unit(s) SubCutaneous every 12 hours  levothyroxine 88 MICROGram(s) Oral every 24 hours  ticagrelor 90 milliGRAM(s) Oral every 12 hours    MEDICATIONS  (PRN):      naproxen (Unknown)      LABS                        10.8   6.51  )-----------( 128      ( 05 Jan 2024 05:25 )             33.7     01-05    138  |  102  |  44<H>  ----------------------------<  129<H>  4.5   |  24  |  1.31<H>    Ca    9.3      05 Jan 2024 05:25  Phos  4.2     01-05  Mg     2.1     01-05    TPro  6.5  /  Alb  3.3  /  TBili  1.5<H>  /  DBili  x   /  AST  43<H>  /  ALT  35  /  AlkPhos  129<H>  01-05    PT/INR - ( 05 Jan 2024 05:25 )   PT: 32.7 sec;   INR: 2.96          PTT - ( 05 Jan 2024 05:25 )  PTT:34.3 sec  Urinalysis Basic - ( 05 Jan 2024 05:25 )    Color: x / Appearance: x / SG: x / pH: x  Gluc: 129 mg/dL / Ketone: x  / Bili: x / Urobili: x   Blood: x / Protein: x / Nitrite: x   Leuk Esterase: x / RBC: x / WBC x   Sq Epi: x / Non Sq Epi: x / Bacteria: x      CARDIAC MARKERS ( 04 Jan 2024 16:42 )  x     / x     / 1230 U/L / x     / 216.8 ng/mL  CARDIAC MARKERS ( 04 Jan 2024 13:21 )  x     / x     / 555 U/L / x     / 80.5 ng/mL          IMAGING/EKG/ETC

## 2024-01-05 NOTE — PROGRESS NOTE ADULT - ASSESSMENT
Pt is a 88F with a PMHx hypothyroidism p/w 1 hour of CP found to have anterolateral STEMI s/p SHILO to mLAD

## 2024-01-05 NOTE — PROGRESS NOTE ADULT - ATTENDING COMMENTS
Pt is an 87 y/o woman c hypothyroidism who p/w CP and found to have Ant STEMI on ECG.    ECG: Sinus @ 86 c 7mm FREDERICK V1-V4  Cath: mid % s/p PCI    Overnight, pt with runs of NSVT, longest beat 18 seconds (somewhat irregular shorter runs?)    afeb, 80, 105/68, 97% RA    Hgb 10.8  Plt 128    Cr 0.98 --> 1.31    AST 43  Dianne [id number]    CXR: WNL  TTE: EF 25%     - pt with ant lateral stemi s/p PCI to mLAD  - cont asa/brilanta  - would decrease to lipitor 40 since >75 years  - started valsartan in pt; today's CR 1.3, would watch before restarting ACE/ARB ARNI  - consider b-blocker today; watch for further NSVT  - if more NSVT, consider vest on d/c  Monitor rhythm  Would start ARB for Ant Stemi  Check TTE

## 2024-01-05 NOTE — PROGRESS NOTE ADULT - SUBJECTIVE AND OBJECTIVE BOX
INTERVAL HPI/OVERNIGHT EVENTS:  As per night team, no overnight events. Patient seen and examined at bedside. Patient denies fever, chills, dizziness, weakness, HA, CP, SOB, N/V/D/C    VITALS  Vital Signs Last 24 Hrs  T(C): 36.6 (05 Jan 2024 08:31), Max: 37.1 (05 Jan 2024 01:01)  T(F): 97.9 (05 Jan 2024 08:31), Max: 98.8 (05 Jan 2024 01:01)  HR: 80 (05 Jan 2024 11:00) (70 - 99)  BP: 99/58 (05 Jan 2024 11:00) (95/52 - 152/74)  BP(mean): 76 (05 Jan 2024 11:00) (68 - 104)  RR: 18 (05 Jan 2024 11:00) (16 - 19)  SpO2: 93% (05 Jan 2024 11:00) (92% - 98%)    Parameters below as of 05 Jan 2024 11:00  Patient On (Oxygen Delivery Method): room air    PHYSICAL EXAM  General: NAD, lying in bed comfortably  Head: NC/AT; MMM; PERRL; EOMI;  Neck: Supple; no JVD  Respiratory: CTAB; no wheezes/rales/rhonchi  Cardiovascular: Regular rhythm/rate; S1/S2+, no murmurs, rubs gallops   Gastrointestinal: Soft; NTND; bowel sounds normal and present  Extremities: WWP; no edema/cyanosis  Neurological: A&Ox3, answering all questions appropriately    MEDICATIONS  (STANDING):  aspirin  chewable 81 milliGRAM(s) Oral every 24 hours  atorvastatin 40 milliGRAM(s) Oral at bedtime  heparin   Injectable 5000 Unit(s) SubCutaneous every 12 hours  levothyroxine 88 MICROGram(s) Oral every 24 hours  metoprolol succinate ER 25 milliGRAM(s) Oral every 24 hours  sodium chloride 0.9% Bolus 250 milliLiter(s) IV Bolus once  ticagrelor 90 milliGRAM(s) Oral every 12 hours      ALLERGIES:  naproxen (Unknown)      LABS                        10.8   6.51  )-----------( 128      ( 05 Jan 2024 05:25 )             33.7     01-05    138  |  102  |  44<H>  ----------------------------<  129<H>  4.5   |  24  |  1.31<H>    Ca    9.3      05 Jan 2024 05:25  Phos  4.2     01-05  Mg     2.1     01-05    TPro  6.5  /  Alb  3.3  /  TBili  1.5<H>  /  DBili  x   /  AST  43<H>  /  ALT  35  /  AlkPhos  129<H>  01-05    PT/INR - ( 05 Jan 2024 05:25 )   PT: 32.7 sec;   INR: 2.96          PTT - ( 05 Jan 2024 05:25 )  PTT:34.3 sec  Urinalysis Basic - ( 05 Jan 2024 05:25 )    Color: x / Appearance: x / SG: x / pH: x  Gluc: 129 mg/dL / Ketone: x  / Bili: x / Urobili: x   Blood: x / Protein: x / Nitrite: x   Leuk Esterase: x / RBC: x / WBC x   Sq Epi: x / Non Sq Epi: x / Bacteria: x      CARDIAC MARKERS ( 05 Jan 2024 05:25 )  x     / x     / 221 U/L / x     / 5.8 ng/mL  CARDIAC MARKERS ( 04 Jan 2024 16:42 )  x     / x     / 1230 U/L / x     / 216.8 ng/mL  CARDIAC MARKERS ( 04 Jan 2024 13:21 )  x     / x     / 555 U/L / x     / 80.5 ng/mL        RADIOLOGY & ADDITIONAL TESTS: Reviewed

## 2024-01-06 ENCOUNTER — TRANSCRIPTION ENCOUNTER (OUTPATIENT)
Age: 89
End: 2024-01-06

## 2024-01-06 VITALS — TEMPERATURE: 98 F

## 2024-01-06 LAB
ALBUMIN SERPL ELPH-MCNC: 3.2 G/DL — LOW (ref 3.3–5)
ALBUMIN SERPL ELPH-MCNC: 3.2 G/DL — LOW (ref 3.3–5)
ALP SERPL-CCNC: 54 U/L — SIGNIFICANT CHANGE UP (ref 40–120)
ALP SERPL-CCNC: 54 U/L — SIGNIFICANT CHANGE UP (ref 40–120)
ALT FLD-CCNC: 23 U/L — SIGNIFICANT CHANGE UP (ref 10–45)
ALT FLD-CCNC: 23 U/L — SIGNIFICANT CHANGE UP (ref 10–45)
ANION GAP SERPL CALC-SCNC: 11 MMOL/L — SIGNIFICANT CHANGE UP (ref 5–17)
ANION GAP SERPL CALC-SCNC: 11 MMOL/L — SIGNIFICANT CHANGE UP (ref 5–17)
APTT BLD: 26.2 SEC — SIGNIFICANT CHANGE UP (ref 24.5–35.6)
APTT BLD: 26.2 SEC — SIGNIFICANT CHANGE UP (ref 24.5–35.6)
AST SERPL-CCNC: 112 U/L — HIGH (ref 10–40)
AST SERPL-CCNC: 112 U/L — HIGH (ref 10–40)
BILIRUB SERPL-MCNC: 0.8 MG/DL — SIGNIFICANT CHANGE UP (ref 0.2–1.2)
BILIRUB SERPL-MCNC: 0.8 MG/DL — SIGNIFICANT CHANGE UP (ref 0.2–1.2)
BLD GP AB SCN SERPL QL: NEGATIVE — SIGNIFICANT CHANGE UP
BLD GP AB SCN SERPL QL: NEGATIVE — SIGNIFICANT CHANGE UP
BUN SERPL-MCNC: 22 MG/DL — SIGNIFICANT CHANGE UP (ref 7–23)
BUN SERPL-MCNC: 22 MG/DL — SIGNIFICANT CHANGE UP (ref 7–23)
CALCIUM SERPL-MCNC: 8.9 MG/DL — SIGNIFICANT CHANGE UP (ref 8.4–10.5)
CALCIUM SERPL-MCNC: 8.9 MG/DL — SIGNIFICANT CHANGE UP (ref 8.4–10.5)
CHLORIDE SERPL-SCNC: 104 MMOL/L — SIGNIFICANT CHANGE UP (ref 96–108)
CHLORIDE SERPL-SCNC: 104 MMOL/L — SIGNIFICANT CHANGE UP (ref 96–108)
CO2 SERPL-SCNC: 20 MMOL/L — LOW (ref 22–31)
CO2 SERPL-SCNC: 20 MMOL/L — LOW (ref 22–31)
CREAT SERPL-MCNC: 1.07 MG/DL — SIGNIFICANT CHANGE UP (ref 0.5–1.3)
CREAT SERPL-MCNC: 1.07 MG/DL — SIGNIFICANT CHANGE UP (ref 0.5–1.3)
EGFR: 50 ML/MIN/1.73M2 — LOW
EGFR: 50 ML/MIN/1.73M2 — LOW
GLUCOSE SERPL-MCNC: 111 MG/DL — HIGH (ref 70–99)
GLUCOSE SERPL-MCNC: 111 MG/DL — HIGH (ref 70–99)
HCT VFR BLD CALC: 37.5 % — SIGNIFICANT CHANGE UP (ref 34.5–45)
HCT VFR BLD CALC: 37.5 % — SIGNIFICANT CHANGE UP (ref 34.5–45)
HGB BLD-MCNC: 12.4 G/DL — SIGNIFICANT CHANGE UP (ref 11.5–15.5)
HGB BLD-MCNC: 12.4 G/DL — SIGNIFICANT CHANGE UP (ref 11.5–15.5)
LACTATE SERPL-SCNC: 2 MMOL/L — SIGNIFICANT CHANGE UP (ref 0.5–2)
LACTATE SERPL-SCNC: 2 MMOL/L — SIGNIFICANT CHANGE UP (ref 0.5–2)
MAGNESIUM SERPL-MCNC: 2.2 MG/DL — SIGNIFICANT CHANGE UP (ref 1.6–2.6)
MAGNESIUM SERPL-MCNC: 2.2 MG/DL — SIGNIFICANT CHANGE UP (ref 1.6–2.6)
MCHC RBC-ENTMCNC: 32.3 PG — SIGNIFICANT CHANGE UP (ref 27–34)
MCHC RBC-ENTMCNC: 32.3 PG — SIGNIFICANT CHANGE UP (ref 27–34)
MCHC RBC-ENTMCNC: 33.1 GM/DL — SIGNIFICANT CHANGE UP (ref 32–36)
MCHC RBC-ENTMCNC: 33.1 GM/DL — SIGNIFICANT CHANGE UP (ref 32–36)
MCV RBC AUTO: 97.7 FL — SIGNIFICANT CHANGE UP (ref 80–100)
MCV RBC AUTO: 97.7 FL — SIGNIFICANT CHANGE UP (ref 80–100)
NRBC # BLD: 0 /100 WBCS — SIGNIFICANT CHANGE UP (ref 0–0)
NRBC # BLD: 0 /100 WBCS — SIGNIFICANT CHANGE UP (ref 0–0)
PHOSPHATE SERPL-MCNC: 2.8 MG/DL — SIGNIFICANT CHANGE UP (ref 2.5–4.5)
PHOSPHATE SERPL-MCNC: 2.8 MG/DL — SIGNIFICANT CHANGE UP (ref 2.5–4.5)
PLATELET # BLD AUTO: 288 K/UL — SIGNIFICANT CHANGE UP (ref 150–400)
PLATELET # BLD AUTO: 288 K/UL — SIGNIFICANT CHANGE UP (ref 150–400)
POTASSIUM SERPL-MCNC: 4.2 MMOL/L — SIGNIFICANT CHANGE UP (ref 3.5–5.3)
POTASSIUM SERPL-MCNC: 4.2 MMOL/L — SIGNIFICANT CHANGE UP (ref 3.5–5.3)
POTASSIUM SERPL-SCNC: 4.2 MMOL/L — SIGNIFICANT CHANGE UP (ref 3.5–5.3)
POTASSIUM SERPL-SCNC: 4.2 MMOL/L — SIGNIFICANT CHANGE UP (ref 3.5–5.3)
PROT SERPL-MCNC: 6.2 G/DL — SIGNIFICANT CHANGE UP (ref 6–8.3)
PROT SERPL-MCNC: 6.2 G/DL — SIGNIFICANT CHANGE UP (ref 6–8.3)
RBC # BLD: 3.84 M/UL — SIGNIFICANT CHANGE UP (ref 3.8–5.2)
RBC # BLD: 3.84 M/UL — SIGNIFICANT CHANGE UP (ref 3.8–5.2)
RBC # FLD: 13.7 % — SIGNIFICANT CHANGE UP (ref 10.3–14.5)
RBC # FLD: 13.7 % — SIGNIFICANT CHANGE UP (ref 10.3–14.5)
RH IG SCN BLD-IMP: POSITIVE — SIGNIFICANT CHANGE UP
RH IG SCN BLD-IMP: POSITIVE — SIGNIFICANT CHANGE UP
SODIUM SERPL-SCNC: 135 MMOL/L — SIGNIFICANT CHANGE UP (ref 135–145)
SODIUM SERPL-SCNC: 135 MMOL/L — SIGNIFICANT CHANGE UP (ref 135–145)
WBC # BLD: 8.85 K/UL — SIGNIFICANT CHANGE UP (ref 3.8–10.5)
WBC # BLD: 8.85 K/UL — SIGNIFICANT CHANGE UP (ref 3.8–10.5)
WBC # FLD AUTO: 8.85 K/UL — SIGNIFICANT CHANGE UP (ref 3.8–10.5)
WBC # FLD AUTO: 8.85 K/UL — SIGNIFICANT CHANGE UP (ref 3.8–10.5)

## 2024-01-06 PROCEDURE — C1894: CPT

## 2024-01-06 PROCEDURE — 83605 ASSAY OF LACTIC ACID: CPT

## 2024-01-06 PROCEDURE — C1887: CPT

## 2024-01-06 PROCEDURE — 84436 ASSAY OF TOTAL THYROXINE: CPT

## 2024-01-06 PROCEDURE — 84300 ASSAY OF URINE SODIUM: CPT

## 2024-01-06 PROCEDURE — 83036 HEMOGLOBIN GLYCOSYLATED A1C: CPT

## 2024-01-06 PROCEDURE — 80061 LIPID PANEL: CPT

## 2024-01-06 PROCEDURE — 84156 ASSAY OF PROTEIN URINE: CPT

## 2024-01-06 PROCEDURE — 86901 BLOOD TYPING SEROLOGIC RH(D): CPT

## 2024-01-06 PROCEDURE — 85025 COMPLETE CBC W/AUTO DIFF WBC: CPT

## 2024-01-06 PROCEDURE — C1760: CPT

## 2024-01-06 PROCEDURE — 36415 COLL VENOUS BLD VENIPUNCTURE: CPT

## 2024-01-06 PROCEDURE — 99291 CRITICAL CARE FIRST HOUR: CPT

## 2024-01-06 PROCEDURE — 80053 COMPREHEN METABOLIC PANEL: CPT

## 2024-01-06 PROCEDURE — 83735 ASSAY OF MAGNESIUM: CPT

## 2024-01-06 PROCEDURE — 84100 ASSAY OF PHOSPHORUS: CPT

## 2024-01-06 PROCEDURE — 86850 RBC ANTIBODY SCREEN: CPT

## 2024-01-06 PROCEDURE — 85347 COAGULATION TIME ACTIVATED: CPT

## 2024-01-06 PROCEDURE — C8929: CPT

## 2024-01-06 PROCEDURE — 84443 ASSAY THYROID STIM HORMONE: CPT

## 2024-01-06 PROCEDURE — 85610 PROTHROMBIN TIME: CPT

## 2024-01-06 PROCEDURE — 93005 ELECTROCARDIOGRAM TRACING: CPT

## 2024-01-06 PROCEDURE — 86900 BLOOD TYPING SEROLOGIC ABO: CPT

## 2024-01-06 PROCEDURE — 82553 CREATINE MB FRACTION: CPT

## 2024-01-06 PROCEDURE — 84484 ASSAY OF TROPONIN QUANT: CPT

## 2024-01-06 PROCEDURE — C1874: CPT

## 2024-01-06 PROCEDURE — C1769: CPT

## 2024-01-06 PROCEDURE — 82803 BLOOD GASES ANY COMBINATION: CPT

## 2024-01-06 PROCEDURE — C1725: CPT

## 2024-01-06 PROCEDURE — 81001 URINALYSIS AUTO W/SCOPE: CPT

## 2024-01-06 PROCEDURE — 71045 X-RAY EXAM CHEST 1 VIEW: CPT

## 2024-01-06 PROCEDURE — 71045 X-RAY EXAM CHEST 1 VIEW: CPT | Mod: 26

## 2024-01-06 PROCEDURE — 82570 ASSAY OF URINE CREATININE: CPT

## 2024-01-06 PROCEDURE — 82550 ASSAY OF CK (CPK): CPT

## 2024-01-06 PROCEDURE — 83880 ASSAY OF NATRIURETIC PEPTIDE: CPT

## 2024-01-06 PROCEDURE — 83935 ASSAY OF URINE OSMOLALITY: CPT

## 2024-01-06 PROCEDURE — 85730 THROMBOPLASTIN TIME PARTIAL: CPT

## 2024-01-06 PROCEDURE — 99239 HOSP IP/OBS DSCHRG MGMT >30: CPT

## 2024-01-06 PROCEDURE — 85027 COMPLETE CBC AUTOMATED: CPT

## 2024-01-06 RX ORDER — ATORVASTATIN CALCIUM 80 MG/1
1 TABLET, FILM COATED ORAL
Qty: 30 | Refills: 0
Start: 2024-01-06 | End: 2024-02-04

## 2024-01-06 RX ORDER — LEVOTHYROXINE SODIUM 125 MCG
1 TABLET ORAL
Qty: 60 | Refills: 0
Start: 2024-01-06 | End: 2024-03-05

## 2024-01-06 RX ORDER — METOPROLOL TARTRATE 50 MG
1 TABLET ORAL
Qty: 60 | Refills: 0
Start: 2024-01-06 | End: 2024-03-05

## 2024-01-06 RX ORDER — LOSARTAN POTASSIUM 100 MG/1
1 TABLET, FILM COATED ORAL
Qty: 30 | Refills: 0
Start: 2024-01-06 | End: 2024-02-04

## 2024-01-06 RX ORDER — ASPIRIN/CALCIUM CARB/MAGNESIUM 324 MG
1 TABLET ORAL
Qty: 30 | Refills: 0
Start: 2024-01-06 | End: 2024-02-04

## 2024-01-06 RX ORDER — LEVOTHYROXINE SODIUM 125 MCG
1 TABLET ORAL
Refills: 0 | DISCHARGE

## 2024-01-06 RX ORDER — TICAGRELOR 90 MG/1
1 TABLET ORAL
Qty: 120 | Refills: 0
Start: 2024-01-06 | End: 2024-03-05

## 2024-01-06 RX ADMIN — TICAGRELOR 90 MILLIGRAM(S): 90 TABLET ORAL at 10:15

## 2024-01-06 RX ADMIN — HEPARIN SODIUM 5000 UNIT(S): 5000 INJECTION INTRAVENOUS; SUBCUTANEOUS at 10:16

## 2024-01-06 RX ADMIN — Medication 88 MICROGRAM(S): at 06:11

## 2024-01-06 RX ADMIN — Medication 25 MILLIGRAM(S): at 10:15

## 2024-01-06 NOTE — PROGRESS NOTE ADULT - PROBLEM SELECTOR PROBLEM 1
STEMI involving left anterior descending coronary artery
STEMI involving left anterior descending coronary artery

## 2024-01-06 NOTE — PROGRESS NOTE ADULT - SUBJECTIVE AND OBJECTIVE BOX
INTERVAL HPI/OVERNIGHT EVENTS:  As per night team, no overnight events. Patient seen and examined at bedside. States she feels well. Denies chest pain and shortness of breath. Rash on her left upper back from adhesive. Patient denies fever, chills, dizziness, weakness, HA, CP, SOB, N/V/D/C    VITALS  Vital Signs Last 24 Hrs  T(C): 37.1 (06 Jan 2024 04:47), Max: 37.3 (05 Jan 2024 22:06)  T(F): 98.7 (06 Jan 2024 04:47), Max: 99.1 (05 Jan 2024 22:06)  HR: 86 (06 Jan 2024 08:30) (68 - 95)  BP: 90/52 (06 Jan 2024 08:30) (90/52 - 110/63)  BP(mean): 65 (06 Jan 2024 08:30) (65 - 80)  RR: 16 (06 Jan 2024 08:30) (16 - 27)  SpO2: 94% (06 Jan 2024 08:30) (93% - 95%)    Parameters below as of 06 Jan 2024 08:30  Patient On (Oxygen Delivery Method): room air    PHYSICAL EXAM  General: NAD, sitting comfortably in bed reading  HEENT: PERRL/ EOMI, no scleral icterus, MMM  Neck: Supple, no JVD  Respiratory: lungs CTA b/l, no wheezes/crackles, no accessory muscle use  Cardiovascular: Regular rhythm/rate; +S1 +S2, no murmurs  Gastrointestinal: Soft, NTND, normoactive BS, no rebound, no guarding  Genitourinary: no suprapubic tenderness  Extremities: WWP, no cyanosis, no clubbing, no edema, pulses equal  Neurological: A&Ox3, no gross focal deficits, follows commands  Skin: Normal temperature, warm, dry    MEDICATIONS  (STANDING):  aspirin  chewable 81 milliGRAM(s) Oral every 24 hours  atorvastatin 40 milliGRAM(s) Oral at bedtime  heparin   Injectable 5000 Unit(s) SubCutaneous every 12 hours  levothyroxine 88 MICROGram(s) Oral every 24 hours  metoprolol succinate ER 25 milliGRAM(s) Oral every 24 hours  ticagrelor 90 milliGRAM(s) Oral every 12 hours    MEDICATIONS  (PRN):      naproxen (Unknown)      LABS                        12.3   9.52  )-----------( 296      ( 05 Jan 2024 15:31 )             38.3     01-06    135  |  104  |  22  ----------------------------<  111<H>  4.2   |  20<L>  |  1.07    Ca    8.9      06 Jan 2024 07:13  Phos  2.8     01-06  Mg     2.2     01-06    TPro  6.2  /  Alb  3.2<L>  /  TBili  0.8  /  DBili  x   /  AST  112<H>  /  ALT  23  /  AlkPhos  54  01-06    PT/INR - ( 05 Jan 2024 05:25 )   PT: 32.7 sec;   INR: 2.96          PTT - ( 05 Jan 2024 05:25 )  PTT:34.3 sec  Urinalysis Basic - ( 06 Jan 2024 07:13 )    Color: x / Appearance: x / SG: x / pH: x  Gluc: 111 mg/dL / Ketone: x  / Bili: x / Urobili: x   Blood: x / Protein: x / Nitrite: x   Leuk Esterase: x / RBC: x / WBC x   Sq Epi: x / Non Sq Epi: x / Bacteria: x      CARDIAC MARKERS ( 05 Jan 2024 15:31 )  x     / x     / 827 U/L / x     / 47.7 ng/mL  CARDIAC MARKERS ( 05 Jan 2024 05:25 )  x     / x     / 221 U/L / x     / 5.8 ng/mL  CARDIAC MARKERS ( 04 Jan 2024 16:42 )  x     / x     / 1230 U/L / x     / 216.8 ng/mL  CARDIAC MARKERS ( 04 Jan 2024 13:21 )  x     / x     / 555 U/L / x     / 80.5 ng/mL        RADIOLOGY & ADDITIONAL TESTS: Reviewed INTERVAL HPI/OVERNIGHT EVENTS:  As per night team, no overnight events. Patient seen and examined at bedside. States she feels well. Denies chest pain and shortness of breath. Rash on her left upper back from adhesive. Patient denies fever, chills, dizziness, weakness, HA, CP, SOB, N/V/D/C    VITALS  Vital Signs Last 24 Hrs  T(C): 37.1 (06 Jan 2024 04:47), Max: 37.3 (05 Jan 2024 22:06)  T(F): 98.7 (06 Jan 2024 04:47), Max: 99.1 (05 Jan 2024 22:06)  HR: 86 (06 Jan 2024 08:30) (68 - 95)  BP: 90/52 (06 Jan 2024 08:30) (90/52 - 110/63)  BP(mean): 65 (06 Jan 2024 08:30) (65 - 80)  RR: 16 (06 Jan 2024 08:30) (16 - 27)  SpO2: 94% (06 Jan 2024 08:30) (93% - 95%)    Parameters below as of 06 Jan 2024 08:30  Patient On (Oxygen Delivery Method): room air    PHYSICAL EXAM  General: NAD, sitting comfortably in bed reading  HEENT: PERRL/ EOMI, no scleral icterus, MMM  Neck: Supple, no JVD  Respiratory: lungs CTA b/l, no wheezes/crackles, no accessory muscle use  Cardiovascular: Regular rhythm/rate; +S1 +S2, no murmurs  Gastrointestinal: Soft, NTND, normoactive BS, no rebound, no guarding  Genitourinary: no suprapubic tenderness  Extremities: WWP, no cyanosis, no clubbing, no edema, pulses equal  Neurological: A&Ox3, no gross focal deficits, follows commands  Skin: Normal temperature, warm, dry, rash on left upper back from adhesive     MEDICATIONS  (STANDING):  aspirin  chewable 81 milliGRAM(s) Oral every 24 hours  atorvastatin 40 milliGRAM(s) Oral at bedtime  heparin   Injectable 5000 Unit(s) SubCutaneous every 12 hours  levothyroxine 88 MICROGram(s) Oral every 24 hours  metoprolol succinate ER 25 milliGRAM(s) Oral every 24 hours  ticagrelor 90 milliGRAM(s) Oral every 12 hours    ALLERGIES:  naproxen (Unknown)      LABS                        12.3   9.52  )-----------( 296      ( 05 Jan 2024 15:31 )             38.3     01-06    135  |  104  |  22  ----------------------------<  111<H>  4.2   |  20<L>  |  1.07    Ca    8.9      06 Jan 2024 07:13  Phos  2.8     01-06  Mg     2.2     01-06    TPro  6.2  /  Alb  3.2<L>  /  TBili  0.8  /  DBili  x   /  AST  112<H>  /  ALT  23  /  AlkPhos  54  01-06    PT/INR - ( 05 Jan 2024 05:25 )   PT: 32.7 sec;   INR: 2.96          PTT - ( 05 Jan 2024 05:25 )  PTT:34.3 sec  Urinalysis Basic - ( 06 Jan 2024 07:13 )    Color: x / Appearance: x / SG: x / pH: x  Gluc: 111 mg/dL / Ketone: x  / Bili: x / Urobili: x   Blood: x / Protein: x / Nitrite: x   Leuk Esterase: x / RBC: x / WBC x   Sq Epi: x / Non Sq Epi: x / Bacteria: x      CARDIAC MARKERS ( 05 Jan 2024 15:31 )  x     / x     / 827 U/L / x     / 47.7 ng/mL  CARDIAC MARKERS ( 05 Jan 2024 05:25 )  x     / x     / 221 U/L / x     / 5.8 ng/mL  CARDIAC MARKERS ( 04 Jan 2024 16:42 )  x     / x     / 1230 U/L / x     / 216.8 ng/mL  CARDIAC MARKERS ( 04 Jan 2024 13:21 )  x     / x     / 555 U/L / x     / 80.5 ng/mL        RADIOLOGY & ADDITIONAL TESTS: Reviewed

## 2024-01-06 NOTE — PROGRESS NOTE ADULT - PROBLEM SELECTOR PLAN 1
#Anterolateral STEMI  Pt with substernal CP and EKG with FREDERICK on V1-V3. s/p Asa 325 prior to arrival to the ED. Pt went to cath lab and LHC showing 100% occlusion of mid LAD, thrombotic s/p SHILO and Nitroglycerin. Troponin 91. Pro-. Post-revascularization EKG with FREDERICK in V1 and V2, TWI in V1-V3. DC'd Entresto as pt has JUANITA  - c/w toprol 25  - c/w ASA 81qd  - c/w statin 40mg   - start brilinta 80mg qd
#Anterolateral STEMI  Pt with substernal CP and EKG with FREDERICK on V1-V3. s/p Asa 325 prior to arrival to the ED. Pt went to cath lab and LHC showing 100% occlusion of mid LAD, thrombotic s/p SHILO and Nitroglycerin. Troponin 91. Pro-. Post-revascularization EKG with FREDERICK in V1 and V2, TWI in V1-V3. DC'd Entresto as pt has JUANITA  - start toprol 25  - c/w ASA 81qd  - c/w statin 40mg   - start brilinta 80mg qd  - trend trops

## 2024-01-06 NOTE — DISCHARGE NOTE NURSING/CASE MANAGEMENT/SOCIAL WORK - NSDCPEFALRISK_GEN_ALL_CORE
For information on Fall & Injury Prevention, visit: [email or web address] OR  [email or web address] OR  https://www.cdc.gov/steadi/patient.html For information on Fall & Injury Prevention, visit: [email or web address] OR  [email or web address] OR  https://www.cdc.gov/steadi/patient.html

## 2024-01-06 NOTE — PROGRESS NOTE ADULT - PROBLEM SELECTOR PLAN 6
Fluids: none  Electrolytes: Mg >2, K >4  Nutrition: Regular diet  Prophylaxis: heparin subq  Activity: as tolerated  GI: none  Dispo: CCU
Fluids: none  Electrolytes: Mg >2, K >4  Nutrition: Regular diet  Prophylaxis: heparin subq  Activity: as tolerated  GI: none  Dispo: CCU

## 2024-01-06 NOTE — PROGRESS NOTE ADULT - PROBLEM SELECTOR PLAN 2
TTE (1/4/23) showing Left ventricular systolic function is severely reduced w/ EF of 25-30% with regional wall motion   abnormalities.  - c/w toprol 25  - start ACEi/ARB when JUANITA clears
TTE (1/4/23) showing Left ventricular systolic function is severely reduced w/ EF of 25-30% with regional wall motion   abnormalities.  - c/w toprol 25  - start ACEi/ARB when JUANITA clears

## 2024-01-06 NOTE — DISCHARGE NOTE NURSING/CASE MANAGEMENT/SOCIAL WORK - PATIENT PORTAL LINK FT
You can access the FollowMyHealth Patient Portal offered by [facility] by registering at the following website: [email or web address]. By joining [employer]’s FollowMyHealth portal, you will also be able to view your health information using other applications (apps) compatible with our system. You can access the FollowMyHealth Patient Portal offered by [facility] by registering at the following website: [email or web address]. By joining [employer]’s FollowMyHealth portal, you will also be able to view your health information using other applications (apps) compatible with our system.

## 2024-01-06 NOTE — PROGRESS NOTE ADULT - PROBLEM SELECTOR PLAN 4
JUANITA likely ELLEN  - ctm and trend  - dc'd entresto  - Resolved
JUANITA likely ELLEN  - ctm and trend  - dc'd entresto

## 2024-01-12 DIAGNOSIS — R73.03 PREDIABETES: ICD-10-CM

## 2024-01-12 DIAGNOSIS — I47.20 VENTRICULAR TACHYCARDIA, UNSPECIFIED: ICD-10-CM

## 2024-01-12 DIAGNOSIS — I21.3 ST ELEVATION (STEMI) MYOCARDIAL INFARCTION OF UNSPECIFIED SITE: ICD-10-CM

## 2024-01-12 DIAGNOSIS — T50.8X5A ADVERSE EFFECT OF DIAGNOSTIC AGENTS, INITIAL ENCOUNTER: ICD-10-CM

## 2024-01-12 DIAGNOSIS — I25.10 ATHEROSCLEROTIC HEART DISEASE OF NATIVE CORONARY ARTERY WITHOUT ANGINA PECTORIS: ICD-10-CM

## 2024-01-12 DIAGNOSIS — Z88.6 ALLERGY STATUS TO ANALGESIC AGENT: ICD-10-CM

## 2024-01-12 DIAGNOSIS — I21.02 ST ELEVATION (STEMI) MYOCARDIAL INFARCTION INVOLVING LEFT ANTERIOR DESCENDING CORONARY ARTERY: ICD-10-CM

## 2024-01-12 DIAGNOSIS — Z90.710 ACQUIRED ABSENCE OF BOTH CERVIX AND UTERUS: ICD-10-CM

## 2024-01-12 DIAGNOSIS — N17.9 ACUTE KIDNEY FAILURE, UNSPECIFIED: ICD-10-CM

## 2024-01-12 DIAGNOSIS — Z85.42 PERSONAL HISTORY OF MALIGNANT NEOPLASM OF OTHER PARTS OF UTERUS: ICD-10-CM

## 2024-01-12 DIAGNOSIS — N14.11 CONTRAST-INDUCED NEPHROPATHY: ICD-10-CM

## 2024-01-12 DIAGNOSIS — E03.9 HYPOTHYROIDISM, UNSPECIFIED: ICD-10-CM
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
## 2024-01-12 DIAGNOSIS — E78.5 HYPERLIPIDEMIA, UNSPECIFIED: ICD-10-CM

## 2024-01-12 DIAGNOSIS — I25.84 CORONARY ATHEROSCLEROSIS DUE TO CALCIFIED CORONARY LESION: ICD-10-CM

## 2024-01-12 LAB
ISTAT ACTK (ACTIVATED CLOTTING TIME KAOLIN): 266 SEC — HIGH (ref 74–137)
ISTAT ACTK (ACTIVATED CLOTTING TIME KAOLIN): 266 SEC — HIGH (ref 74–137)

## 2024-01-16 ENCOUNTER — APPOINTMENT (OUTPATIENT)
Dept: HEART AND VASCULAR | Facility: CLINIC | Age: 89
End: 2024-01-16

## 2024-04-16 PROBLEM — Z00.00 ENCOUNTER FOR PREVENTIVE HEALTH EXAMINATION: Status: ACTIVE | Noted: 2024-04-16
